# Patient Record
Sex: FEMALE | Race: WHITE | NOT HISPANIC OR LATINO | Employment: FULL TIME | URBAN - NONMETROPOLITAN AREA
[De-identification: names, ages, dates, MRNs, and addresses within clinical notes are randomized per-mention and may not be internally consistent; named-entity substitution may affect disease eponyms.]

---

## 2023-10-11 ENCOUNTER — OFFICE VISIT (OUTPATIENT)
Dept: INTERNAL MEDICINE | Facility: CLINIC | Age: 58
End: 2023-10-11
Payer: COMMERCIAL

## 2023-10-11 VITALS
RESPIRATION RATE: 20 BRPM | WEIGHT: 128 LBS | HEIGHT: 68 IN | OXYGEN SATURATION: 97 % | TEMPERATURE: 97.1 F | DIASTOLIC BLOOD PRESSURE: 82 MMHG | BODY MASS INDEX: 19.4 KG/M2 | SYSTOLIC BLOOD PRESSURE: 124 MMHG | HEART RATE: 88 BPM

## 2023-10-11 DIAGNOSIS — M19.90 ARTHRITIS: Primary | ICD-10-CM

## 2023-10-11 DIAGNOSIS — G43.809 OTHER MIGRAINE WITHOUT STATUS MIGRAINOSUS, NOT INTRACTABLE: ICD-10-CM

## 2023-10-11 DIAGNOSIS — R21 RASH: ICD-10-CM

## 2023-10-11 DIAGNOSIS — K20.0 EOSINOPHILIC ESOPHAGITIS: ICD-10-CM

## 2023-10-11 DIAGNOSIS — E03.9 ACQUIRED HYPOTHYROIDISM: ICD-10-CM

## 2023-10-11 PROBLEM — G43.909 MIGRAINE: Status: ACTIVE | Noted: 2023-10-11

## 2023-10-11 RX ORDER — RIZATRIPTAN BENZOATE 10 MG/1
10 TABLET ORAL ONCE AS NEEDED
COMMUNITY
Start: 2002-11-01 | End: 2023-10-11 | Stop reason: SDUPTHER

## 2023-10-11 RX ORDER — EPINEPHRINE 0.3 MG/.3ML
INJECTION SUBCUTANEOUS
COMMUNITY
Start: 2023-06-19

## 2023-10-11 RX ORDER — LEVOTHYROXINE SODIUM 0.1 MG/1
100 TABLET ORAL DAILY
Qty: 90 TABLET | Refills: 3 | Status: SHIPPED | OUTPATIENT
Start: 2023-10-11

## 2023-10-11 RX ORDER — ESTRADIOL 0.1 MG/G
CREAM VAGINAL
COMMUNITY
End: 2023-10-11 | Stop reason: SDUPTHER

## 2023-10-11 RX ORDER — ESTRADIOL 0.1 MG/G
1 CREAM VAGINAL 2 TIMES WEEKLY
Qty: 42.5 G | Refills: 5 | Status: SHIPPED | OUTPATIENT
Start: 2023-10-12

## 2023-10-11 RX ORDER — DIPHENOXYLATE HYDROCHLORIDE AND ATROPINE SULFATE 2.5; .025 MG/1; MG/1
1 TABLET ORAL DAILY
COMMUNITY
Start: 2021-01-01

## 2023-10-11 RX ORDER — CELECOXIB 200 MG/1
200 CAPSULE ORAL DAILY
Qty: 90 CAPSULE | Refills: 3 | Status: SHIPPED | OUTPATIENT
Start: 2023-10-11

## 2023-10-11 RX ORDER — LEVOTHYROXINE SODIUM 0.1 MG/1
100 TABLET ORAL
COMMUNITY
Start: 1996-01-01 | End: 2023-10-11 | Stop reason: SDUPTHER

## 2023-10-11 RX ORDER — RIZATRIPTAN BENZOATE 10 MG/1
10 TABLET ORAL ONCE AS NEEDED
Qty: 27 TABLET | Refills: 3 | Status: SHIPPED | OUTPATIENT
Start: 2023-10-11

## 2023-10-11 NOTE — PROGRESS NOTES
"Subjective     Patient ID: Agata Talbert is a 58 y.o. female. Patient is here for management of multiple medical problems.     Chief Complaint   Patient presents with    Foot Injury    Cough     History of Present Illness   Getting established[place]       The following portions of the patient's history were reviewed and updated as appropriate: allergies, current medications, past family history, past medical history, past social history, past surgical history and problem list.    Review of Systems    Current Outpatient Medications:     EPINEPHrine (EPIPEN) 0.3 MG/0.3ML solution auto-injector injection, INJECT AS NEEDED FOR SYSTEMIC ALLERGIC REACTION, Disp: , Rfl:     estradiol (ESTRACE) 0.1 MG/GM vaginal cream, Insert 1 g into the vagina 2 (Two) Times a Week., Disp: 42.5 g, Rfl: 5    levothyroxine (SYNTHROID, LEVOTHROID) 100 MCG tablet, Take 1 tablet by mouth Daily., Disp: 90 tablet, Rfl: 3    multivitamin (MULTI-VITAMIN DAILY PO), 1 tablet Daily., Disp: , Rfl:     rizatriptan (MAXALT) 10 MG tablet, Take 1 tablet by mouth 1 (One) Time As Needed for Migraine., Disp: 27 tablet, Rfl: 3    celecoxib (CeleBREX) 200 MG capsule, Take 1 capsule by mouth Daily., Disp: 90 capsule, Rfl: 3    Diclofenac Sodium (VOLTAREN) 1 % gel gel, Apply 4 g topically to the appropriate area as directed 4 (Four) Times a Day As Needed (pain)., Disp: 100 g, Rfl: 1    Objective      Blood pressure 124/82, pulse 88, temperature 97.1 øF (36.2 øC), temperature source Temporal, resp. rate 20, height 172.7 cm (68\"), weight 58.1 kg (128 lb), SpO2 97%.    Physical Exam     General Appearance:    Alert, cooperative, no distress, appears stated age   Head:    Normocephalic, without obvious abnormality, atraumatic   Eyes:    PERRL, conjunctiva/corneas clear, EOM's intact   Ears:    Normal TM's and external ear canals, both ears   Nose:   Nares normal, septum midline, mucosa normal, no drainage   or sinus tenderness   Throat:   Lips, " mucosa, and tongue normal; teeth and gums normal   Neck:   Supple, symmetrical, trachea midline, no adenopathy;        thyroid:  No enlargement/tenderness/nodules; no carotid    bruit or JVD   Back:     Symmetric, no curvature, ROM normal, no CVA tenderness   Lungs:     Clear to auscultation bilaterally, respirations unlabored   Chest wall:    No tenderness or deformity   Heart:    Regular rate and rhythm, S1 and S2 normal, no murmur,        rub or gallop   Abdomen:     Soft, non-tender, bowel sounds active all four quadrants,     no masses, no organomegaly   Extremities:   Extremities normal, atraumatic, no cyanosis or edema   Pulses:   2+ and symmetric all extremities   Skin:   Skin color, texture, turgor normal, no rashes or lesions   Lymph nodes:   Cervical, supraclavicular, and axillary nodes normal   Neurologic:   CNII-XII intact. Normal strength, sensation and reflexes       throughout      No results found for this or any previous visit.      Assessment & Plan     Failed most nsaids.  Wants celbrex.    Arthrits worked up for the most part. From [place]. Arthritis never fully under stood.    Feels tired.   Memory fog.    Covid vaccined were given long after the arthritis was an issue.      MRI spin and myelogram. For Siatica.  Had surgy exploratory had nerve decompression. Not seen on studies.       EOE multiple food allergies.  Started in desensitizing inj. Not helpful.            Diagnoses and all orders for this visit:    1. Arthritis (Primary)  -     [place] Spotted Fever, IgM  -     [place] Spotted Fever, IgG  -     Lyme Disease, PCR - , Arm, Right  -     Ehrlichia Antibody Panel  -     Vitamin B6  -     Antistreptolysin O Titer  -     Comprehensive Metabolic Panel  -     CBC & Differential  -     Strongyloides Antibody IgG, JESSEE  -     Alpha-Gal IgE Panel  -     H. Pylori Antibody, IgG (CLOVIS, COR)  -     H. Pylori Breath Test - Breath, Lung  -     Ambulatory Referral to Gastroenterology    2. Other  migraine without status migrainosus, not intractable  -     rizatriptan (MAXALT) 10 MG tablet; Take 1 tablet by mouth 1 (One) Time As Needed for Migraine.  Dispense: 27 tablet; Refill: 3  -     Discontinue: CeleBREX 200 MG capsule; Take 1 capsule by mouth Daily.  Dispense: 90 capsule; Refill: 3  -     [place] Spotted Fever, IgM  -     [place] Spotted Fever, IgG  -     Lyme Disease, PCR - , Arm, Right  -     Ehrlichia Antibody Panel  -     Vitamin B6  -     Antistreptolysin O Titer  -     Comprehensive Metabolic Panel  -     CBC & Differential  -     Strongyloides Antibody IgG, JESSEE  -     Alpha-Gal IgE Panel  -     H. Pylori Antibody, IgG ([facility], COR)  -     H. Pylori Breath Test - Breath, Lung  -     Ambulatory Referral to Gastroenterology    3. Rash  -     [place] Spotted Fever, IgM  -     [place] Spotted Fever, IgG  -     Lyme Disease, PCR - , Arm, Right  -     Ehrlichia Antibody Panel  -     Vitamin B6  -     Antistreptolysin O Titer  -     Comprehensive Metabolic Panel  -     CBC & Differential  -     Strongyloides Antibody IgG, JESSEE  -     Alpha-Gal IgE Panel  -     H. Pylori Antibody, IgG ([facility], COR)  -     H. Pylori Breath Test - Breath, Lung  -     Ambulatory Referral to Gastroenterology    4. Eosinophilic esophagitis  -     [place] Spotted Fever, IgM  -     [place] Spotted Fever, IgG  -     Lyme Disease, PCR - , Arm, Right  -     Ehrlichia Antibody Panel  -     Vitamin B6  -     Antistreptolysin O Titer  -     Comprehensive Metabolic Panel  -     CBC & Differential  -     Strongyloides Antibody IgG, JESSEE  -     Alpha-Gal IgE Panel  -     H. Pylori Antibody, IgG ([facility], COR)  -     H. Pylori Breath Test - Breath, Lung  -     Ambulatory Referral to Gastroenterology    5. Acquired hypothyroidism  -     levothyroxine (SYNTHROID, LEVOTHROID) 100 MCG tablet; Take 1 tablet by mouth Daily.  Dispense: 90 tablet; Refill: 3    Other orders  -     estradiol (ESTRACE) 0.1 MG/GM vaginal cream;  Insert 1 g into the vagina 2 (Two) Times a Week.  Dispense: 42.5 g; Refill: 5  -     Diclofenac Sodium (VOLTAREN) 1 % gel gel; Apply 4 g topically to the appropriate area as directed 4 (Four) Times a Day As Needed (pain).  Dispense: 100 g; Refill: 1  -     celecoxib (CeleBREX) 200 MG capsule; Take 1 capsule by mouth Daily.  Dispense: 90 capsule; Refill: 3      No follow-ups on file.          There are no Patient Instructions on file for this visit.     Diogo Monge MD    Assessment & Plan

## 2023-10-11 NOTE — LETTER
October 13, 2023     Patient: Agata Talbert   YOB: 1965   Date of Visit: 10/11/2023       To Whom It May Concern:    It is my medical opinion that Agata Talbert .           Sincerely,        Diogo Monge MD    CC: No Recipients

## 2023-10-12 ENCOUNTER — TELEPHONE (OUTPATIENT)
Dept: INTERNAL MEDICINE | Facility: CLINIC | Age: 58
End: 2023-10-12
Payer: COMMERCIAL

## 2023-10-12 NOTE — TELEPHONE ENCOUNTER
Kay called along with patient on the line asking for codes for all the labwork ordered on 10/11/2023, so that patient can ensure that these will be covered by her insurance and know her costs before completing the test. Call them at [phone]

## 2023-10-18 LAB
A PHAGOCYTOPH IGG TITR SER IF: NEGATIVE {TITER}
A PHAGOCYTOPH IGM TITR SER IF: NEGATIVE {TITER}
ALBUMIN SERPL-MCNC: 4.8 G/DL (ref 3.5–5.2)
ALBUMIN/GLOB SERPL: 3.4 G/DL
ALP SERPL-CCNC: 67 U/L (ref 39–117)
ALPHA-GAL IGE QN: <0.1 KU/L
ALT SERPL-CCNC: 15 U/L (ref 1–33)
ASO AB SERPL-ACNC: <20 IU/ML (ref 0–200)
AST SERPL-CCNC: 18 U/L (ref 1–32)
B BURGDOR DNA SPEC QL NAA+PROBE: NEGATIVE
BASOPHILS # BLD AUTO: 0.06 10*3/MM3 (ref 0–0.2)
BASOPHILS NFR BLD AUTO: 1.5 % (ref 0–1.5)
BEEF IGE QN: <0.1 KU/L
BILIRUB SERPL-MCNC: 0.7 MG/DL (ref 0–1.2)
BUN SERPL-MCNC: 11 MG/DL (ref 6–20)
BUN/CREAT SERPL: 13.6 (ref 7–25)
CALCIUM SERPL-MCNC: 10.4 MG/DL (ref 8.6–10.5)
CHLORIDE SERPL-SCNC: 101 MMOL/L (ref 98–107)
CO2 SERPL-SCNC: 25.4 MMOL/L (ref 22–29)
CONV CLASS DESCRIPTION: NORMAL
CREAT SERPL-MCNC: 0.81 MG/DL (ref 0.57–1)
E CHAFFEENSIS IGG TITR SER IF: NEGATIVE {TITER}
E CHAFFEENSIS IGM TITR SER IF: NEGATIVE {TITER}
EGFRCR SERPLBLD CKD-EPI 2021: 84.3 ML/MIN/1.73
EOSINOPHIL # BLD AUTO: 0.05 10*3/MM3 (ref 0–0.4)
EOSINOPHIL NFR BLD AUTO: 1.3 % (ref 0.3–6.2)
ERYTHROCYTE [DISTWIDTH] IN BLOOD BY AUTOMATED COUNT: 12.3 % (ref 12.3–15.4)
GLOBULIN SER CALC-MCNC: 1.4 GM/DL
GLUCOSE SERPL-MCNC: 92 MG/DL (ref 65–99)
HCT VFR BLD AUTO: 42.1 % (ref 34–46.6)
HGB BLD-MCNC: 14.2 G/DL (ref 12–15.9)
IGE SERPL-ACNC: 107 IU/ML (ref 6–495)
IMM GRANULOCYTES # BLD AUTO: 0.01 10*3/MM3 (ref 0–0.05)
IMM GRANULOCYTES NFR BLD AUTO: 0.3 % (ref 0–0.5)
LAMB IGE QN: <0.1 KU/L
LYMPHOCYTES # BLD AUTO: 1.45 10*3/MM3 (ref 0.7–3.1)
LYMPHOCYTES NFR BLD AUTO: 37.2 % (ref 19.6–45.3)
MCH RBC QN AUTO: 29 PG (ref 26.6–33)
MCHC RBC AUTO-ENTMCNC: 33.7 G/DL (ref 31.5–35.7)
MCV RBC AUTO: 86.1 FL (ref 79–97)
MONOCYTES # BLD AUTO: 0.27 10*3/MM3 (ref 0.1–0.9)
MONOCYTES NFR BLD AUTO: 6.9 % (ref 5–12)
NEUTROPHILS # BLD AUTO: 2.06 10*3/MM3 (ref 1.7–7)
NEUTROPHILS NFR BLD AUTO: 52.8 % (ref 42.7–76)
NRBC BLD AUTO-RTO: 0 /100 WBC (ref 0–0.2)
PLATELET # BLD AUTO: 229 10*3/MM3 (ref 140–450)
PORK IGE QN: <0.1 KU/L
POTASSIUM SERPL-SCNC: 3.9 MMOL/L (ref 3.5–5.2)
PROT SERPL-MCNC: 6.2 G/DL (ref 6–8.5)
PYRIDOXAL PHOS SERPL-MCNC: 50 UG/L (ref 3.4–65.2)
R RICKETTSI IGG SER QL IA: NEGATIVE
R RICKETTSI IGM SER-ACNC: 0.27 INDEX (ref 0–0.89)
RBC # BLD AUTO: 4.89 10*6/MM3 (ref 3.77–5.28)
RESULT COMMENT:: NORMAL
SODIUM SERPL-SCNC: 138 MMOL/L (ref 136–145)
STRONGYLOIDES IGG SER QL IA: NEGATIVE
UREA BREATH TEST QL: NEGATIVE
WBC # BLD AUTO: 3.9 10*3/MM3 (ref 3.4–10.8)

## 2024-01-17 ENCOUNTER — HOSPITAL ENCOUNTER (OUTPATIENT)
Dept: CT IMAGING | Facility: HOSPITAL | Age: 59
Discharge: HOME | End: 2024-01-17
Admitting: INTERNAL MEDICINE
Payer: COMMERCIAL

## 2024-01-17 ENCOUNTER — OFFICE VISIT (OUTPATIENT)
Dept: INTERNAL MEDICINE | Facility: CLINIC | Age: 59
End: 2024-01-17
Payer: COMMERCIAL

## 2024-01-17 VITALS
TEMPERATURE: 98.2 F | OXYGEN SATURATION: 97 % | BODY MASS INDEX: 24.88 KG/M2 | DIASTOLIC BLOOD PRESSURE: 88 MMHG | RESPIRATION RATE: 16 BRPM | WEIGHT: 168 LBS | HEART RATE: 83 BPM | SYSTOLIC BLOOD PRESSURE: 128 MMHG | HEIGHT: 69 IN

## 2024-01-17 DIAGNOSIS — G47.33 OSA (OBSTRUCTIVE SLEEP APNEA): ICD-10-CM

## 2024-01-17 DIAGNOSIS — Z12.31 ENCOUNTER FOR SCREENING MAMMOGRAM FOR MALIGNANT NEOPLASM OF BREAST: Primary | ICD-10-CM

## 2024-01-17 DIAGNOSIS — M54.50 CHRONIC RIGHT-SIDED LOW BACK PAIN WITHOUT SCIATICA: ICD-10-CM

## 2024-01-17 DIAGNOSIS — R10.31 RIGHT LOWER QUADRANT ABDOMINAL PAIN: ICD-10-CM

## 2024-01-17 DIAGNOSIS — G89.29 CHRONIC RIGHT-SIDED LOW BACK PAIN WITHOUT SCIATICA: ICD-10-CM

## 2024-01-17 PROCEDURE — 74178 CT ABD&PLV WO CNTR FLWD CNTR: CPT

## 2024-01-17 PROCEDURE — 99214 OFFICE O/P EST MOD 30 MIN: CPT | Performed by: INTERNAL MEDICINE

## 2024-01-17 PROCEDURE — 25510000001 IOPAMIDOL 61 % SOLUTION: Performed by: INTERNAL MEDICINE

## 2024-01-17 RX ADMIN — IOPAMIDOL 100 ML: 612 INJECTION, SOLUTION INTRAVENOUS at 17:10

## 2024-01-17 NOTE — PROGRESS NOTES
"Subjective     Patient ID: Agata Talbert is a 59 y.o. female. Patient is here for management of multiple medical problems.     Chief Complaint   Patient presents with    Arthritis    Pelvic Pain     History of Present Illness       Arthritis.         November 2022. Pain in right lower quad. Seen gyn. Overies ok.     2023 got a bit worse. Seen GI.   I started seening her hin Oct.  No sig pain. Then.     Now after eating.   Lower back is back and now daily since November. After eating back pain and ab pain.        The following portions of the patient's history were reviewed and updated as appropriate: allergies, current medications, past family history, past medical history, past social history, past surgical history and problem list.    Review of Systems    Current Outpatient Medications:     EPINEPHrine (EPIPEN) 0.3 MG/0.3ML solution auto-injector injection, INJECT AS NEEDED FOR SYSTEMIC ALLERGIC REACTION, Disp: , Rfl:     levothyroxine (SYNTHROID, LEVOTHROID) 100 MCG tablet, Take 1 tablet by mouth Daily. (Patient taking differently: Take 1 tablet by mouth Daily. Take 100 MCG daily, except for Sundays.), Disp: 90 tablet, Rfl: 3    multivitamin (MULTI-VITAMIN DAILY PO), 1 tablet Daily., Disp: , Rfl:     rizatriptan (MAXALT) 10 MG tablet, Take 1 tablet by mouth 1 (One) Time As Needed for Migraine., Disp: 27 tablet, Rfl: 3    Objective      Blood pressure 128/88, pulse 83, temperature 98.2 °F (36.8 °C), resp. rate 16, height 175.3 cm (69\"), weight 76.2 kg (168 lb), SpO2 97%.    BMI is within normal parameters. No other follow-up for BMI required.       Physical Exam     General Appearance:    Alert, cooperative, no distress, appears stated age   Head:    Normocephalic, without obvious abnormality, atraumatic   Eyes:    PERRL, conjunctiva/corneas clear, EOM's intact   Ears:    Normal TM's and external ear canals, both ears   Nose:   Nares normal, septum midline, mucosa normal, no drainage   or sinus tenderness "   Throat:   Narrowed oral air way. Lips, mucosa, and tongue normal; teeth and gums normal   Neck:   Supple, symmetrical, trachea midline, no adenopathy;        thyroid:  No enlargement/tenderness/nodules; no carotid    bruit or JVD   Back:     Symmetric, no curvature, ROM normal, no CVA tenderness   Lungs:     Clear to auscultation bilaterally, respirations unlabored   Chest wall:    No tenderness or deformity   Heart:    Regular rate and rhythm, S1 and S2 normal, no murmur,        rub or gallop   Abdomen:     Soft, non-tender, bowel sounds active all four quadrants,     no masses, no organomegaly   Extremities:   Extremities normal, atraumatic, no cyanosis or edema   Pulses:   2+ and symmetric all extremities   Skin:   Skin color, texture, turgor normal, no rashes or lesions   Lymph nodes:   Cervical, supraclavicular, and axillary nodes normal   Neurologic:   CNII-XII intact. Normal strength, sensation and reflexes       throughout      Results for orders placed or performed in visit on 10/11/23   Lyme Disease, PCR - , Arm, Right    Specimen: Arm, Right   Result Value Ref Range    Lyme Disease(B.burgdorferi)PCR Negative Negative   H. Pylori Breath Test - Breath, Lung    Specimen: Lung; Breath   Result Value Ref Range    H. pylori Breath Test Negative Negative   [place] Spotted Fever, IgM    Specimen: Blood   Result Value Ref Range    RMSF IgM 0.27 0.00 - 0.89 index   [facility] Spotted Fever, IgG    Specimen: Blood   Result Value Ref Range    RMSF IgG Negative Negative   Ehrlichia Antibody Panel    Specimen: Blood   Result Value Ref Range    E. chaffeensis ([facility]) IgG Titer Negative Neg:<1:64    E. chaffeensis ([facility]) IgM Titer Negative Neg:<1:20    [facility] IgG Titer Negative Neg:<1:64    [facility] IgM Titer Negative Neg:<1:20    RESULT COMMENT: Comment    Vitamin B6    Specimen: Blood   Result Value Ref Range    Vitamin B6 50.0 3.4 - 65.2 ug/L   Antistreptolysin O Titer    Specimen: Blood   Result Value Ref Range    ASO  <20.0 0.0 - 200.0 IU/mL   Comprehensive Metabolic Panel    Specimen: Blood   Result Value Ref Range    Glucose 92 65 - 99 mg/dL    BUN 11 6 - 20 mg/dL    Creatinine 0.81 0.57 - 1.00 mg/dL    EGFR Result 84.3 >60.0 mL/min/1.73    BUN/Creatinine Ratio 13.6 7.0 - 25.0    Sodium 138 136 - 145 mmol/L    Potassium 3.9 3.5 - 5.2 mmol/L    Chloride 101 98 - 107 mmol/L    Total CO2 25.4 22.0 - 29.0 mmol/L    Calcium 10.4 8.6 - 10.5 mg/dL    Total Protein 6.2 6.0 - 8.5 g/dL    Albumin 4.8 3.5 - 5.2 g/dL    Globulin 1.4 gm/dL    A/G Ratio 3.4 g/dL    Total Bilirubin 0.7 0.0 - 1.2 mg/dL    Alkaline Phosphatase 67 39 - 117 U/L    AST (SGOT) 18 1 - 32 U/L    ALT (SGPT) 15 1 - 33 U/L   Strongyloides Antibody IgG, JESSEE    Specimen: Blood   Result Value Ref Range    Strongyloides Ab IgG Negative Negative   Alpha-Gal IgE Panel    Specimen: Blood   Result Value Ref Range    Class Description Comment     IgE 107 6 - 495 IU/mL    [id number] Alpha-Gal <0.10 Class 0 kU/L    Beef <0.10 Class 0 kU/L    Pork <0.10 Class 0 kU/L    Lamb <0.10 Class 0 kU/L   CBC & Differential    Specimen: Blood   Result Value Ref Range    WBC 3.90 3.40 - 10.80 10*3/mm3    RBC 4.89 3.77 - 5.28 10*6/mm3    Hemoglobin 14.2 12.0 - 15.9 g/dL    Hematocrit 42.1 34.0 - 46.6 %    MCV 86.1 79.0 - 97.0 fL    MCH 29.0 26.6 - 33.0 pg    MCHC 33.7 31.5 - 35.7 g/dL    RDW 12.3 12.3 - 15.4 %    Platelets 229 140 - 450 10*3/mm3    Neutrophil Rel % 52.8 42.7 - 76.0 %    Lymphocyte Rel % 37.2 19.6 - 45.3 %    Monocyte Rel % 6.9 5.0 - 12.0 %    Eosinophil Rel % 1.3 0.3 - 6.2 %    Basophil Rel % 1.5 0.0 - 1.5 %    Neutrophils Absolute 2.06 1.70 - 7.00 10*3/mm3    Lymphocytes Absolute 1.45 0.70 - 3.10 10*3/mm3    Monocytes Absolute 0.27 0.10 - 0.90 10*3/mm3    Eosinophils Absolute 0.05 0.00 - 0.40 10*3/mm3    Basophils Absolute 0.06 0.00 - 0.20 10*3/mm3    Immature Granulocyte Rel % 0.3 0.0 - 0.5 %    Immature Grans Absolute 0.01 0.00 - 0.05 10*3/mm3    nRBC 0.0 0.0 - 0.2 /100 WBC          Assessment & Plan       Pt with difficulty getting to sleep. Light sleeper.   Easily wakes to herself snoring.     Pain in right lower ab. Waxes and wanes.  X 1 year. Overall worsening in the last 6 months. Will get CT eval for mass or other issuse in the right lower ab.           Diagnoses and all orders for this visit:    1. Encounter for screening mammogram for malignant neoplasm of breast (Primary)    2. Right lower quadrant abdominal pain  -     CT Abdomen Pelvis With & Without Contrast; Future  -     Basic metabolic panel    3. Chronic right-sided low back pain without sciatica    4. MEGAN (obstructive sleep apnea)  -     Home Sleep Study  -     Ambulatory Referral to Sleep Medicine      Return in about 4 months (around 5/17/2024).          There are no Patient Instructions on file for this visit.     Diogo Monge MD    Assessment & Plan       Answers submitted by the patient for this visit:  Primary Reason for Visit (Submitted on 1/10/2024)  What is the primary reason for your visit?: Back Pain

## 2024-01-18 NOTE — PROGRESS NOTES
Details      Reading Physician Reading Date Result Priority  Jonathan Ibanez MD  [phone] 1/17/2024     Narrative & Impression  FINAL REPORT    TECHNIQUE:  Routine axial images were obtained from the lung bases to below  the iliac crest, before and following IV contrast administration.    CLINICAL HISTORY:  See Diagnosis  rt lower quad abd pain    FINDINGS:  Abdomen: The gallbladder, solid abdominal organs and ureters are  normal. The GI tract is normal, with no sign of appendicitis.  Pelvis: The uterus and ovaries are not visualized.  The urinary  bladder is normal. There is no pelvic or abdominal ascites,  adenopathy or acute osseous abnormality.  There are  postoperative changes in the lower lumbar spine.    IMPRESSION:  No acute disease.  Per radiology.    I see a big bladder and arthritis in right hip.

## 2024-01-23 ENCOUNTER — OFFICE VISIT (OUTPATIENT)
Dept: INTERNAL MEDICINE | Facility: CLINIC | Age: 59
End: 2024-01-23
Payer: COMMERCIAL

## 2024-01-23 VITALS
DIASTOLIC BLOOD PRESSURE: 78 MMHG | HEIGHT: 69 IN | OXYGEN SATURATION: 98 % | WEIGHT: 168 LBS | RESPIRATION RATE: 16 BRPM | SYSTOLIC BLOOD PRESSURE: 104 MMHG | HEART RATE: 74 BPM | BODY MASS INDEX: 24.88 KG/M2

## 2024-01-23 DIAGNOSIS — N32.0 BLADDER OBSTRUCTION: ICD-10-CM

## 2024-01-23 DIAGNOSIS — Z12.31 ENCOUNTER FOR SCREENING MAMMOGRAM FOR MALIGNANT NEOPLASM OF BREAST: Primary | ICD-10-CM

## 2024-01-23 DIAGNOSIS — M54.50 CHRONIC RIGHT-SIDED LOW BACK PAIN WITHOUT SCIATICA: ICD-10-CM

## 2024-01-23 DIAGNOSIS — G89.29 CHRONIC RIGHT-SIDED LOW BACK PAIN WITHOUT SCIATICA: ICD-10-CM

## 2024-01-23 RX ORDER — POLYETHYLENE GLYCOL 3350 17 G/17G
17 POWDER, FOR SOLUTION ORAL DAILY
Qty: 1 EACH | Refills: 3 | Status: SHIPPED | OUTPATIENT
Start: 2024-01-23

## 2024-01-23 NOTE — PROGRESS NOTES
"Subjective     Patient ID: Agata Talbert is a 59 y.o. female. Patient is here for management of multiple medical problems.     Chief Complaint   Patient presents with    Abdominal Pain    Arthritis     Right hip     History of Present Illness     The following portions of the patient's history were reviewed and updated as appropriate: allergies, current medications, past family history, past medical history, past social history, past surgical history and problem list.    Review of Systems    Current Outpatient Medications:     EPINEPHrine (EPIPEN) 0.3 MG/0.3ML solution auto-injector injection, INJECT AS NEEDED FOR SYSTEMIC ALLERGIC REACTION, Disp: , Rfl:     levothyroxine (SYNTHROID, LEVOTHROID) 100 MCG tablet, Take 1 tablet by mouth Daily. (Patient taking differently: Take 1 tablet by mouth Daily. Take 100 MCG daily, except for Sundays.), Disp: 90 tablet, Rfl: 3    multivitamin (MULTI-VITAMIN DAILY PO), 1 tablet Daily., Disp: , Rfl:     rizatriptan (MAXALT) 10 MG tablet, Take 1 tablet by mouth 1 (One) Time As Needed for Migraine., Disp: 27 tablet, Rfl: 3    polyethylene glycol (MIRALAX) 17 g packet, Take 17 g by mouth Daily., Disp: 1 each, Rfl: 3    Objective      Blood pressure 104/78, pulse 74, resp. rate 16, height 175.3 cm (69\"), weight 76.2 kg (168 lb), SpO2 98%.    BMI is within normal parameters. No other follow-up for BMI required.       Physical Exam     General Appearance:    Alert, cooperative, no distress, appears stated age   Head:    Normocephalic, without obvious abnormality, atraumatic   Eyes:    PERRL, conjunctiva/corneas clear, EOM's intact   Ears:    Normal TM's and external ear canals, both ears   Nose:   Nares normal, septum midline, mucosa normal, no drainage   or sinus tenderness   Throat:   Lips, mucosa, and tongue normal; teeth and gums normal   Neck:   Supple, symmetrical, trachea midline, no adenopathy;        thyroid:  No enlargement/tenderness/nodules; no carotid    bruit or JVD "   Back:     Symmetric, no curvature, ROM normal, no CVA tenderness   Lungs:     Clear to auscultation bilaterally, respirations unlabored   Chest wall:    No tenderness or deformity   Heart:    Regular rate and rhythm, S1 and S2 normal, no murmur,        rub or gallop   Abdomen:     Soft, non-tender, bowel sounds active all four quadrants,     no masses, no organomegaly   Extremities:   Extremities normal, atraumatic, no cyanosis or edema   Pulses:   2+ and symmetric all extremities   Skin:   Skin color, texture, turgor normal, no rashes or lesions   Lymph nodes:   Cervical, supraclavicular, and axillary nodes normal   Neurologic:   CNII-XII intact. Normal strength, sensation and reflexes       throughout      Results for orders placed or performed in visit on 10/11/23   Lyme Disease, PCR - , Arm, Right    Specimen: Arm, Right   Result Value Ref Range    Lyme Disease(B.burgdorferi)PCR Negative Negative   H. Pylori Breath Test - Breath, Lung    Specimen: Lung; Breath   Result Value Ref Range    H. pylori Breath Test Negative Negative   [place] Spotted Fever, IgM    Specimen: Blood   Result Value Ref Range    RMSF IgM 0.27 0.00 - 0.89 index   [facility] Spotted Fever, IgG    Specimen: Blood   Result Value Ref Range    RMSF IgG Negative Negative   Ehrlichia Antibody Panel    Specimen: Blood   Result Value Ref Range    E. chaffeensis ([facility]) IgG Titer Negative Neg:<1:64    E. chaffeensis ([facility]) IgM Titer Negative Neg:<1:20    [facility] IgG Titer Negative Neg:<1:64    [facility] IgM Titer Negative Neg:<1:20    RESULT COMMENT: Comment    Vitamin B6    Specimen: Blood   Result Value Ref Range    Vitamin B6 50.0 3.4 - 65.2 ug/L   Antistreptolysin O Titer    Specimen: Blood   Result Value Ref Range    ASO <20.0 0.0 - 200.0 IU/mL   Comprehensive Metabolic Panel    Specimen: Blood   Result Value Ref Range    Glucose 92 65 - 99 mg/dL    BUN 11 6 - 20 mg/dL    Creatinine 0.81 0.57 - 1.00 mg/dL    EGFR Result 84.3 >60.0 mL/min/1.73     BUN/Creatinine Ratio 13.6 7.0 - 25.0    Sodium 138 136 - 145 mmol/L    Potassium 3.9 3.5 - 5.2 mmol/L    Chloride 101 98 - 107 mmol/L    Total CO2 25.4 22.0 - 29.0 mmol/L    Calcium 10.4 8.6 - 10.5 mg/dL    Total Protein 6.2 6.0 - 8.5 g/dL    Albumin 4.8 3.5 - 5.2 g/dL    Globulin 1.4 gm/dL    A/G Ratio 3.4 g/dL    Total Bilirubin 0.7 0.0 - 1.2 mg/dL    Alkaline Phosphatase 67 39 - 117 U/L    AST (SGOT) 18 1 - 32 U/L    ALT (SGPT) 15 1 - 33 U/L   Strongyloides Antibody IgG, JESSEE    Specimen: Blood   Result Value Ref Range    Strongyloides Ab IgG Negative Negative   Alpha-Gal IgE Panel    Specimen: Blood   Result Value Ref Range    Class Description Comment     IgE 107 6 - 495 IU/mL    [id number] Alpha-Gal <0.10 Class 0 kU/L    Beef <0.10 Class 0 kU/L    Pork <0.10 Class 0 kU/L    Lamb <0.10 Class 0 kU/L   CBC & Differential    Specimen: Blood   Result Value Ref Range    WBC 3.90 3.40 - 10.80 10*3/mm3    RBC 4.89 3.77 - 5.28 10*6/mm3    Hemoglobin 14.2 12.0 - 15.9 g/dL    Hematocrit 42.1 34.0 - 46.6 %    MCV 86.1 79.0 - 97.0 fL    MCH 29.0 26.6 - 33.0 pg    MCHC 33.7 31.5 - 35.7 g/dL    RDW 12.3 12.3 - 15.4 %    Platelets 229 140 - 450 10*3/mm3    Neutrophil Rel % 52.8 42.7 - 76.0 %    Lymphocyte Rel % 37.2 19.6 - 45.3 %    Monocyte Rel % 6.9 5.0 - 12.0 %    Eosinophil Rel % 1.3 0.3 - 6.2 %    Basophil Rel % 1.5 0.0 - 1.5 %    Neutrophils Absolute 2.06 1.70 - 7.00 10*3/mm3    Lymphocytes Absolute 1.45 0.70 - 3.10 10*3/mm3    Monocytes Absolute 0.27 0.10 - 0.90 10*3/mm3    Eosinophils Absolute 0.05 0.00 - 0.40 10*3/mm3    Basophils Absolute 0.06 0.00 - 0.20 10*3/mm3    Immature Granulocyte Rel % 0.3 0.0 - 0.5 %    Immature Grans Absolute 0.01 0.00 - 0.05 10*3/mm3    nRBC 0.0 0.0 - 0.2 /100 WBC         Assessment & Plan   Pain in right lower ab.  Last colonoscopy 2019.   Large amount of stool right side colon.   Arthritis in right hip.  Mulitple etiology.    No GB. Pt is s/p ccy.     Home sleep not done yet.      Ct ab.  Lumbar, s/p surgery, imaging scattered in this area. May just need xray. Will defer any further imaging to NS.     SLR neg. Hip exam neg.         Diagnoses and all orders for this visit:    1. Encounter for screening mammogram for malignant neoplasm of breast (Primary)  -     Mammo Screening Digital Tomosynthesis Bilateral With CAD; Future    2. Bladder obstruction  -     Ambulatory Referral to Urology    3. Chronic right-sided low back pain without sciatica  -     Ambulatory Referral to Neurosurgery    Other orders  -     polyethylene glycol (MIRALAX) 17 g packet; Take 17 g by mouth Daily.  Dispense: 1 each; Refill: 3      No follow-ups on file.          There are no Patient Instructions on file for this visit.     Diogo Monge MD    Assessment & Plan       Answers submitted by the patient for this visit:  Other (Submitted on 1/19/2024)  Please describe your symptoms.: LRQ and back pain  Have you had these symptoms before?: Yes  How long have you been having these symptoms?: Greater than 2 weeks  Please describe any probable cause for these symptoms. : can't do normal activities  Primary Reason for Visit (Submitted on 1/19/2024)  What is the primary reason for your visit?: Other

## 2024-02-05 ENCOUNTER — PATIENT ROUNDING (BHMG ONLY) (OUTPATIENT)
Dept: UROLOGY | Facility: CLINIC | Age: 59
End: 2024-02-05
Payer: COMMERCIAL

## 2024-02-05 ENCOUNTER — OFFICE VISIT (OUTPATIENT)
Dept: UROLOGY | Facility: CLINIC | Age: 59
End: 2024-02-05
Payer: COMMERCIAL

## 2024-02-05 VITALS
HEIGHT: 69 IN | DIASTOLIC BLOOD PRESSURE: 82 MMHG | SYSTOLIC BLOOD PRESSURE: 118 MMHG | BODY MASS INDEX: 24.88 KG/M2 | WEIGHT: 168 LBS

## 2024-02-05 DIAGNOSIS — N32.0 BLADDER OBSTRUCTION: Primary | ICD-10-CM

## 2024-02-05 PROBLEM — L30.9 ECZEMA: Status: ACTIVE | Noted: 2024-02-05

## 2024-02-05 PROBLEM — M51.369 DEGENERATION OF LUMBAR INTERVERTEBRAL DISC: Status: ACTIVE | Noted: 2024-02-05

## 2024-02-05 PROBLEM — M54.9 BACKACHE: Status: ACTIVE | Noted: 2024-02-05

## 2024-02-05 PROBLEM — M51.36 DEGENERATION OF LUMBAR INTERVERTEBRAL DISC: Status: ACTIVE | Noted: 2024-02-05

## 2024-02-05 LAB
BILIRUB BLD-MCNC: NEGATIVE MG/DL
CLARITY, POC: CLEAR
COLOR UR: YELLOW
EXPIRATION DATE: NORMAL
GLUCOSE UR STRIP-MCNC: NEGATIVE MG/DL
KETONES UR QL: NEGATIVE
LEUKOCYTE EST, POC: NEGATIVE
Lab: NORMAL
NITRITE UR-MCNC: NEGATIVE MG/ML
PH UR: 7 [PH] (ref 5–8)
PROT UR STRIP-MCNC: NEGATIVE MG/DL
RBC # UR STRIP: NEGATIVE /UL
SP GR UR: 1 (ref 1–1.03)
UROBILINOGEN UR QL: NORMAL

## 2024-02-05 PROCEDURE — 99203 OFFICE O/P NEW LOW 30 MIN: CPT | Performed by: NURSE PRACTITIONER

## 2024-02-05 PROCEDURE — 81003 URINALYSIS AUTO W/O SCOPE: CPT | Performed by: NURSE PRACTITIONER

## 2024-02-05 PROCEDURE — 51798 US URINE CAPACITY MEASURE: CPT | Performed by: NURSE PRACTITIONER

## 2024-02-05 NOTE — PROGRESS NOTES
Office Visit General Female New      Patient Name: Agata Talbert  : 1965   MRN: [id number]     Chief Complaint:   Chief Complaint   Patient presents with    Bladder obstruction     New patient       Referring Provider: Diogo Monge MD    History of Present Illness: Ms. Talbert is a 59 y.o. female with below past medical history who presents with worry over being told that her bladder was enlarged on recent CT imaging.  She has a history of L5-S1 fusion in  and history of hysterectomy.  She was recently seen by her PCP and was referred to urology for an enlarged bladder on CT imaging, GI for constipation, and orthopedics for dull lumbar back pain.      Denies UTI symptoms, no hematuria, no incontinence of bowel or bladder, no POP.  She urinates every 2 hours but attests that she drinks approximately 166 ounces of water per day and drinks 2 cups of coffee.  No juice.  Nocturia 1-2 times every 2 months.     She was restarted on Miralax and does not feel constipated today and has not noticed any change since starting this medication.  She is scheduled to establish care with orthopedics in 1 week and with GI in March.      Subjective      Review of System:   Review of Systems   Constitutional:  Negative for chills, diaphoresis and fever.   Gastrointestinal:  Negative for abdominal pain, constipation, diarrhea, nausea and vomiting.   Genitourinary:  Negative for decreased urine volume, difficulty urinating, dysuria, flank pain, frequency, hematuria, pelvic pain, pelvic pressure and urinary incontinence.   Musculoskeletal:  Positive for back pain.      Past Medical History:   Past Medical History:   Diagnosis Date    Arthritis     Eosinophilic esophagitis     Hashimoto's disease     Migraines     Vaginal atrophy        Past Surgical History:   Past Surgical History:   Procedure Laterality Date    BACK SURGERY      fusion l5 s1    CHOLECYSTECTOMY      HYSTERECTOMY         Family History:   Family  "History   Problem Relation Age of Onset    Breast cancer Mother     Atrial fibrillation Mother     Hypoparathyroidism Mother     Hypertension Mother     Cancer Mother     Heart disease Mother     Thyroid disease Father     Liver cancer Sister     Stroke Sister     Heart disease Sister     Sjogren's syndrome Sister     Clotting disorder Brother      Social History:   Social History     Socioeconomic History    Marital status:    Tobacco Use    Smoking status: Never    Smokeless tobacco: Never   Vaping Use    Vaping Use: Never used   Substance and Sexual Activity    Alcohol use: Never    Sexual activity: Not Currently     Birth control/protection: Hysterectomy     Medications:     Current Outpatient Medications:     EPINEPHrine (EPIPEN) 0.3 MG/0.3ML solution auto-injector injection, INJECT AS NEEDED FOR SYSTEMIC ALLERGIC REACTION, Disp: , Rfl:     levothyroxine (SYNTHROID, LEVOTHROID) 100 MCG tablet, Take 1 tablet by mouth Daily. (Patient taking differently: Take 1 tablet by mouth Daily. Take 100 MCG daily, except for Sundays.), Disp: 90 tablet, Rfl: 3    multivitamin (MULTI-VITAMIN DAILY PO), 1 tablet Daily., Disp: , Rfl:     polyethylene glycol (MIRALAX) 17 g packet, Take 17 g by mouth Daily., Disp: 1 each, Rfl: 3    rizatriptan (MAXALT) 10 MG tablet, Take 1 tablet by mouth 1 (One) Time As Needed for Migraine., Disp: 27 tablet, Rfl: 3    Allergies:   Allergies   Allergen Reactions    Bee Venom Anaphylaxis    Nuts Anaphylaxis    Potato Anaphylaxis    Soybean-Containing Drug Products Anaphylaxis       Objective     Physical Exam:   Vital Signs:   Vitals:    02/05/24 0948   BP: 118/82   BP Location: Left arm   Patient Position: Sitting   Cuff Size: Adult   Weight: 76.2 kg (168 lb)   Height: 175.3 cm (69\")     Body mass index is 24.81 kg/m².   Physical Exam  Vitals reviewed.   Constitutional:       General: She is not in acute distress.     Appearance: She is well-groomed. She is not ill-appearing.   Abdominal: "      General: Abdomen is flat. There is no distension.      Palpations: Abdomen is soft.      Tenderness: There is no abdominal tenderness.   Neurological:      Mental Status: She is alert and oriented to person, place, and time.   Psychiatric:         Attention and Perception: Attention normal.        Labs  Brief Urine Lab Results  (Last result in the past 365 days)        Color   Clarity   Blood   Leuk Est   Nitrite   Protein   CREAT   Urine HCG        02/05/24 0956 Yellow   Clear   Negative   Negative   Negative   Negative                 Lab Results   Component Value Date    GLUCOSE 92 10/13/2023    CALCIUM 10.4 10/13/2023     10/13/2023    K 3.9 10/13/2023    CO2 25.4 10/13/2023     10/13/2023    BUN 11 10/13/2023    CREATININE 0.81 10/13/2023    BCR 13.6 10/13/2023       Lab Results   Component Value Date    WBC 3.90 10/13/2023    HGB 14.2 10/13/2023    HCT 42.1 10/13/2023    MCV 86.1 10/13/2023     10/13/2023     Images:   CT Abdomen Pelvis With & Without Contrast    Result Date: 1/17/2024  No acute disease. Authenticated and Electronically Signed by Michelet Ibanez M.D. on 01/17/2024 08:41:21 PM    PVR  Post-void residual performed with ultrasound scanner by staff and interpreted by me - 0    Assessment / Plan      .Assessment  Ms. Talbert is a 59 y.o. female with enlarged bladder.  PVR was 0 and UA was negative for infection.  Reviewed CT imaging and there is no evidence of bladder obstruction and she denies obstructive symptoms.  Keep appointments with GI and Orthopedics as scheduled.  Follow up with our clinic if symptoms develop.      Plan  1. Reviewed UA and PVR.   2. Follow up PRN if you develop symptoms.      Follow Up:   Return if symptoms worsen or fail to improve.    ROBERT Sellers  [facility] [place] [place] [facility]

## 2024-02-09 NOTE — PROGRESS NOTES
February 9, 2024    Hello, may I speak with Agata Talbert? Unable to reach patient.    My name is Bharati.    I am  with [facility]  [address]  [address]  [facility] [address] [phone].    Before we get started may I verify your date of birth? 1965    I am calling to officially welcome you to our practice and ask about your recent visit. Is this a good time to talk?     Tell me about your visit with us. What things went well?         We're always looking for ways to make our patients' experiences even better. Do you have recommendations on ways we may improve?      Overall were you satisfied with your first visit to our practice?        I appreciate you taking the time to speak with me today. Is there anything else I can do for you?       Thank you, and have a great day.

## 2024-03-13 ENCOUNTER — OFFICE VISIT (OUTPATIENT)
Dept: GASTROENTEROLOGY | Facility: CLINIC | Age: 59
End: 2024-03-13
Payer: COMMERCIAL

## 2024-03-13 VITALS
DIASTOLIC BLOOD PRESSURE: 80 MMHG | HEART RATE: 100 BPM | SYSTOLIC BLOOD PRESSURE: 120 MMHG | WEIGHT: 167.2 LBS | OXYGEN SATURATION: 98 % | BODY MASS INDEX: 24.69 KG/M2

## 2024-03-13 DIAGNOSIS — R13.19 ESOPHAGEAL DYSPHAGIA: Primary | ICD-10-CM

## 2024-03-13 DIAGNOSIS — R19.4 CHANGE IN BOWEL HABITS: ICD-10-CM

## 2024-03-13 DIAGNOSIS — R10.31 ABDOMINAL PAIN, RLQ: ICD-10-CM

## 2024-03-13 DIAGNOSIS — K20.0 ESOPHAGITIS, EOSINOPHILIC: ICD-10-CM

## 2024-03-13 PROCEDURE — 99204 OFFICE O/P NEW MOD 45 MIN: CPT | Performed by: INTERNAL MEDICINE

## 2024-03-13 RX ORDER — SODIUM, POTASSIUM,MAG SULFATES 17.5-3.13G
1 SOLUTION, RECONSTITUTED, ORAL ORAL EVERY 12 HOURS
Qty: 354 ML | Refills: 0 | Status: SHIPPED | OUTPATIENT
Start: 2024-03-13 | End: 2024-03-14

## 2024-03-13 NOTE — PROGRESS NOTES
New Patient Consult      Date: 2024   Patient Name: Agata Talbert  MRN: [id number]  : 1965     Referring Physician: Diogo Monge MD    Chief Complaint   Patient presents with    Eosinophilic Esophagitis       History of Present Illness: Agata Talbert is a 59 y.o. female who is here today to establish care with Gastroenterology.    She has had a long history of GI symptoms.  Has had trouble with dysphagia even as a young adult and teenager.    Back in 2019 she had an upper endoscopy, and was diagnosed with eosinophilic esophagitis.  Biopsies confirmed this.  She also had a EOE like appearance, and reportedly also had a dilation done at 1 point.    She had allergy testing after that.  Has not been on a PPI or an acid reflux regimen.  She has adhered to the EOE diet, but despite that she feels that she is having more dysphagia lately, and sometimes some reflux and regurgitation symptoms as well.    Her dermatologist mention Dupixent.    She also has abdominal pain which is located in the right lower quadrant.  No specific aggravating or relieving factors or triggers.  Has been occurring on and off for the past 2 years or so.    She also had a colonoscopy done in 2019 which was done for the purpose of colorectal cancer screening predominantly, and was noted to be unremarkable.  No polyps were noted on that exam.  There were no mucosal biopsies obtained.    She has had trouble with diarrhea, and irregular bowels, which has been better once she started her dietary restrictions, but now seem to be recurring.    Subjective      Past Medical History:   Diagnosis Date    Arthritis     Eosinophilic esophagitis     Hashimoto's disease     Migraines     Vaginal atrophy        Past Surgical History:   Procedure Laterality Date    BACK SURGERY      Fusion L5-S1    CHOLECYSTECTOMY      HYSTERECTOMY      partial       Family History   Problem Relation Age of Onset    Breast cancer Mother     Atrial  fibrillation Mother     Hypoparathyroidism Mother     Hypertension Mother     Cancer Mother     Heart disease Mother     Thyroid disease Father     Liver cancer Sister     Stroke Sister     Heart disease Sister     Sjogren's syndrome Sister     Clotting disorder Brother        Social History     Socioeconomic History    Marital status:    Tobacco Use    Smoking status: Never     Passive exposure: Never    Smokeless tobacco: Never   Vaping Use    Vaping status: Never Used   Substance and Sexual Activity    Alcohol use: Never    Drug use: Never    Sexual activity: Not Currently     Birth control/protection: Hysterectomy         Current Outpatient Medications:     EPINEPHrine (EPIPEN) 0.3 MG/0.3ML solution auto-injector injection, INJECT AS NEEDED FOR SYSTEMIC ALLERGIC REACTION, Disp: , Rfl:     levothyroxine (SYNTHROID, LEVOTHROID) 100 MCG tablet, Take 1 tablet by mouth Daily. (Patient taking differently: Take 1 tablet by mouth Daily. Take 100 MCG daily, except for Sundays.), Disp: 90 tablet, Rfl: 3    multivitamin (MULTI-VITAMIN DAILY PO), 1 tablet Daily., Disp: , Rfl:     polyethylene glycol (MIRALAX) 17 g packet, Take 17 g by mouth Daily., Disp: 1 each, Rfl: 3    rizatriptan (MAXALT) 10 MG tablet, Take 1 tablet by mouth 1 (One) Time As Needed for Migraine., Disp: 27 tablet, Rfl: 3    sodium-potassium-magnesium sulfates (Suprep Bowel Prep Kit) 17.5-3.13-1.6 GM/177ML solution oral solution, Take 1 bottle by mouth Every 12 (Twelve) Hours for 1 day., Disp: 354 mL, Rfl: 0    Allergies   Allergen Reactions    Bee Venom Anaphylaxis    Nuts Anaphylaxis    Potato Anaphylaxis    Soybean-Containing Drug Products Anaphylaxis       Review of Systems   Constitutional:  Negative for unexpected weight loss.   HENT:  Positive for trouble swallowing.    Gastrointestinal:  Positive for abdominal pain, constipation, diarrhea and indigestion. Negative for abdominal distention, anal bleeding, blood in stool, nausea, rectal pain,  vomiting and GERD.       The following portions of the patient's history were reviewed and updated as appropriate: allergies, current medications, past family history, past medical history, past social history, past surgical history and problem list.    Objective     Physical Exam:  Vitals:    03/13/24 0948   BP: 120/80   Pulse: 100   SpO2: 98%   Weight: 75.8 kg (167 lb 3.2 oz)       Physical Exam  Constitutional:       General: She is not in acute distress.     Appearance: Normal appearance.   HENT:      Head: Normocephalic and atraumatic.      Nose: Nose normal.      Mouth/Throat:      Mouth: Mucous membranes are moist.   Eyes:      General: No scleral icterus.     Conjunctiva/sclera: Conjunctivae normal.   Cardiovascular:      Rate and Rhythm: Normal rate.   Pulmonary:      Effort: Pulmonary effort is normal. No respiratory distress.   Abdominal:      General: There is no distension.      Tenderness: There is no abdominal tenderness. There is no guarding.   Musculoskeletal:         General: No deformity or signs of injury.      Cervical back: Neck supple. No rigidity.   Skin:     Capillary Refill: Capillary refill takes less than 2 seconds.      Coloration: Skin is not jaundiced or pale.   Neurological:      General: No focal deficit present.      Mental Status: She is alert and oriented to person, place, and time.   Psychiatric:         Mood and Affect: Mood normal.         Behavior: Behavior normal.         Results Review:   I have reviewed the patient's new clinical and imaging results and agree with the interpretation.     Office Visit on 02/05/2024   Component Date Value Ref Range Status    Color 02/05/2024 Yellow  Yellow, Straw, Dark Yellow, Kadi Final    Clarity, UA 02/05/2024 Clear  Clear Final    Specific Gravity  02/05/2024 1.005  1.005 - 1.030 Final    pH, Urine 02/05/2024 7.0  5.0 - 8.0 Final    Leukocytes 02/05/2024 Negative  Negative Final    Nitrite, UA 02/05/2024 Negative  Negative Final     Protein, POC 02/05/2024 Negative  Negative mg/dL Final    Glucose, UA 02/05/2024 Negative  Negative mg/dL Final    Ketones, UA 02/05/2024 Negative  Negative Final    Urobilinogen, UA 02/05/2024 Normal  Normal, 0.2 E.U./dL Final    Bilirubin 02/05/2024 Negative  Negative Final    Blood, UA 02/05/2024 Negative  Negative Final    Lot Number 02/05/2024 [id number]   Final    Expiration Date 02/05/2024 07/13/2024   Final      CT Abdomen Pelvis With & Without Contrast    Result Date: 1/17/2024  No acute disease. Authenticated and Electronically Signed by Michelet Ibanez M.D. on 01/17/2024 08:41:21 PM     Assessment / Plan      Assessment & Plan:  Diagnoses and all orders for this visit:    1. Esophageal dysphagia (Primary)  -     Case Request; Standing  -     Case Request    2. Esophagitis, eosinophilic  -     Case Request; Standing  -     Case Request    3. Abdominal pain, RLQ  -     Case Request; Standing  -     Case Request    4. Change in bowel habits  -     Case Request; Standing  -     Case Request    Other orders  -     Follow Anesthesia Guidelines / Protocol; Standing  -     Follow Anesthesia Guidelines / Protocol; Future  -     Obtain Informed Consent; Future  -     Verify NPO; Standing  -     sodium-potassium-magnesium sulfates (Suprep Bowel Prep Kit) 17.5-3.13-1.6 GM/177ML solution oral solution; Take 1 bottle by mouth Every 12 (Twelve) Hours for 1 day.  Dispense: 354 mL; Refill: 0        Given change in bowel habits, we'll plan for a diagnostic colonoscopy to evaluate the above symptoms. Broad differential diagnosis. Includes, but is not limited to colitis, ileitis, structural pathology, strictures, and inflammatory disease, such as Crohn's or ulcerative colitis, polyps.  There is also a possibility of eosinophilic ileitis or colitis.    Plan for TI and random colon mucosal biopsies.    Will also bring her in for an upper endoscopy.  Needs repeat assessment for EOE, including biopsies for eosinophil count.    She  would likely require a reflux regimen as well.  Would suggest Pepcid.    Plan for EGD and colonoscopy with monitored anesthesia care. Counseled in detail regarding the risks, benefits and alternatives of the procedure, including but not limited to perforation, bleeding, infection, post-operative pain, complications from anesthesia, aspiration, cardiac decompensation, need for further procedures or surgery, which may occur in approximately 1 in 1000 procedures. Counseled also that endoscopy and colonoscopy are not perfect tests, and there is a possibility of missed diagnoses, including but not limited to polyps or cancer. Counseled regarding pre procedural instructions. Also discussed bowel preparation. Counseled regarding potential, albeit rare complications with bowel preparation specific to this patients medical history and medications, including but not limited to renal insufficiency/failure, electrolyte abnormalities, which may lead to other complications. Hydration is encouraged. Written instructions provided. Instructed to arrange for a ride home for the day of procedure. Patient is in agreement with the above plan and voices understanding of the plan as well as the associated risks and the alternative evaluation/treatment/medication options.     Suprep    Follow Up:   Return for Follow-up 3-6 months post procedure.    Latricia Hernandez MD  [facility] [place]    3/13/2024  10:58 EDT    Part of this note may be an electronic transcription/translation of spoken language to printed text using the Dragon Dictation System.

## 2024-03-14 ENCOUNTER — TELEPHONE (OUTPATIENT)
Dept: GASTROENTEROLOGY | Facility: CLINIC | Age: 59
End: 2024-03-14
Payer: COMMERCIAL

## 2024-03-14 PROBLEM — R13.19 ESOPHAGEAL DYSPHAGIA: Status: ACTIVE | Noted: 2024-03-13

## 2024-03-14 PROBLEM — R10.31 ABDOMINAL PAIN, RLQ: Status: ACTIVE | Noted: 2024-03-13

## 2024-03-14 PROBLEM — K20.0 ESOPHAGITIS, EOSINOPHILIC: Status: ACTIVE | Noted: 2024-03-13

## 2024-03-14 PROBLEM — R19.4 CHANGE IN BOWEL HABITS: Status: ACTIVE | Noted: 2024-03-13

## 2024-03-14 NOTE — TELEPHONE ENCOUNTER
Patient is scheduled for colonoscopy/upper endoscopy on 4/18/24 with Dr. Hernandez. Per Harjit at the [facility] patient called and wants to hold off on procedure. I have cancelled for now and she can call back and reschedule anytime.

## 2024-04-01 ENCOUNTER — TELEPHONE (OUTPATIENT)
Dept: GASTROENTEROLOGY | Facility: CLINIC | Age: 59
End: 2024-04-01
Payer: COMMERCIAL

## 2024-04-01 NOTE — TELEPHONE ENCOUNTER
[facility] staff attempted to follow warm transfer process and was unsuccessful     Caller: Agata Talbert    Relationship to patient: Self    Best call back number: [phone]     Patient is needing: PT IS TRYING TO GET AN ESTIMATE FOR HER UPCOMING PROCEDURE EGD/ COLON WITH DR DUKES ON 4/18. PT SPOKE WITH ESTIMATES AND BILLING DEPARTMENT AND WAS ONLY GIVEN THE ESTIMATE FOR THE PROCEDURES AND NOT THE ANESTHESIOLOGIST OR DR DUKES. PT IS NEEDING TO KNOW WHOM SHE NEEDS TO SPEAK WITH TO GET THE TOTAL BILL FOR HER PROCEDURE.

## 2024-04-18 ENCOUNTER — ANESTHESIA EVENT (OUTPATIENT)
Dept: GASTROENTEROLOGY | Facility: HOSPITAL | Age: 59
End: 2024-04-18
Payer: COMMERCIAL

## 2024-04-18 ENCOUNTER — HOSPITAL ENCOUNTER (OUTPATIENT)
Facility: HOSPITAL | Age: 59
Setting detail: HOSPITAL OUTPATIENT SURGERY
Discharge: HOME | End: 2024-04-18
Attending: INTERNAL MEDICINE | Admitting: INTERNAL MEDICINE
Payer: COMMERCIAL

## 2024-04-18 ENCOUNTER — ANESTHESIA (OUTPATIENT)
Dept: GASTROENTEROLOGY | Facility: HOSPITAL | Age: 59
End: 2024-04-18
Payer: COMMERCIAL

## 2024-04-18 VITALS
RESPIRATION RATE: 18 BRPM | SYSTOLIC BLOOD PRESSURE: 107 MMHG | BODY MASS INDEX: 23.7 KG/M2 | TEMPERATURE: 98.1 F | OXYGEN SATURATION: 98 % | WEIGHT: 160 LBS | DIASTOLIC BLOOD PRESSURE: 52 MMHG | HEART RATE: 66 BPM | HEIGHT: 69 IN

## 2024-04-18 DIAGNOSIS — R19.4 CHANGE IN BOWEL HABITS: ICD-10-CM

## 2024-04-18 DIAGNOSIS — R13.19 ESOPHAGEAL DYSPHAGIA: ICD-10-CM

## 2024-04-18 PROCEDURE — 43239 EGD BIOPSY SINGLE/MULTIPLE: CPT | Performed by: INTERNAL MEDICINE

## 2024-04-18 PROCEDURE — 25810000003 LACTATED RINGERS PER 1000 ML: Performed by: INTERNAL MEDICINE

## 2024-04-18 PROCEDURE — 45380 COLONOSCOPY AND BIOPSY: CPT | Performed by: INTERNAL MEDICINE

## 2024-04-18 PROCEDURE — 25010000002 PROPOFOL 200 MG/20ML EMULSION: Performed by: NURSE ANESTHETIST, CERTIFIED REGISTERED

## 2024-04-18 RX ORDER — SODIUM CHLORIDE, SODIUM LACTATE, POTASSIUM CHLORIDE, CALCIUM CHLORIDE 600; 310; 30; 20 MG/100ML; MG/100ML; MG/100ML; MG/100ML
1000 INJECTION, SOLUTION INTRAVENOUS CONTINUOUS
Status: DISCONTINUED | OUTPATIENT
Start: 2024-04-18 | End: 2024-04-18 | Stop reason: HOSPADM

## 2024-04-18 RX ORDER — SODIUM CHLORIDE 0.9 % (FLUSH) 0.9 %
10 SYRINGE (ML) INJECTION AS NEEDED
Status: DISCONTINUED | OUTPATIENT
Start: 2024-04-18 | End: 2024-04-18 | Stop reason: HOSPADM

## 2024-04-18 RX ORDER — LIDOCAINE HCL/PF 100 MG/5ML
SYRINGE (ML) INJECTION AS NEEDED
Status: DISCONTINUED | OUTPATIENT
Start: 2024-04-18 | End: 2024-04-18 | Stop reason: SURG

## 2024-04-18 RX ORDER — PROPOFOL 10 MG/ML
INJECTION, EMULSION INTRAVENOUS AS NEEDED
Status: DISCONTINUED | OUTPATIENT
Start: 2024-04-18 | End: 2024-04-18 | Stop reason: SURG

## 2024-04-18 RX ADMIN — PROPOFOL 100 MG: 10 INJECTION, EMULSION INTRAVENOUS at 09:31

## 2024-04-18 RX ADMIN — PROPOFOL 100 MG: 10 INJECTION, EMULSION INTRAVENOUS at 09:24

## 2024-04-18 RX ADMIN — SODIUM CHLORIDE, POTASSIUM CHLORIDE, SODIUM LACTATE AND CALCIUM CHLORIDE 1000 ML: 600; 310; 30; 20 INJECTION, SOLUTION INTRAVENOUS at 09:09

## 2024-04-18 RX ADMIN — Medication 40 MG: at 09:24

## 2024-04-18 RX ADMIN — PROPOFOL 100 MG: 10 INJECTION, EMULSION INTRAVENOUS at 09:35

## 2024-04-18 RX ADMIN — PROPOFOL 100 MG: 10 INJECTION, EMULSION INTRAVENOUS at 09:43

## 2024-04-18 NOTE — ANESTHESIA POSTPROCEDURE EVALUATION
Patient: Agata Talbert    Procedure Summary       Date: 04/18/24 Room / Location: [facility] / [facility]    Anesthesia Start: 0921 Anesthesia Stop: 0947    Procedures:       COLONOSCOPY WITH BIOPSY (Anus)      ESOPHAGOSCOPY WITH BIOPSY (Esophagus) Diagnosis:     Surgeons: Latricia Hernandez MD Provider: Rod Trejo CRNA    Anesthesia Type: MAC ASA Status: 2            Anesthesia Type: MAC    Vitals  No vitals data found for the desired time range.          Post Anesthesia Care and Evaluation    Patient location during evaluation: PHASE II  Patient participation: complete - patient participated  Level of consciousness: awake and alert  Pain score: 0  Pain management: satisfactory to patient    Airway patency: patent  Anesthetic complications: No anesthetic complications  PONV Status: none  Cardiovascular status: acceptable and stable  Respiratory status: acceptable and spontaneous ventilation  Hydration status: acceptable    Comments: Vitals signs as noted in nursing documentation as per protocol.

## 2024-04-18 NOTE — H&P
Pre Procedure History and Physical      Date of Procedure: 2024  Patient Name: Agata Talbert  MRN: [id number]  : 1965     Referring provider: Latricia Hernandez MD    History of Present Illness: Patient here for scheduled outpatient endoscopy. See below and prior GI clinic documentation for further details.      Subjective     Past Medical History:   Diagnosis Date    Arthritis     Eosinophilic esophagitis     Hashimoto's disease     Migraines     Vaginal atrophy        Past Surgical History:   Procedure Laterality Date    BACK SURGERY      Fusion L5-S1    CHOLECYSTECTOMY      HYSTERECTOMY      partial       Family History   Problem Relation Age of Onset    Breast cancer Mother     Atrial fibrillation Mother     Hypoparathyroidism Mother     Hypertension Mother     Cancer Mother     Heart disease Mother     Thyroid disease Father     Liver cancer Sister     Stroke Sister     Heart disease Sister     Sjogren's syndrome Sister     Clotting disorder Brother        Social History     Socioeconomic History    Marital status:    Tobacco Use    Smoking status: Never     Passive exposure: Never    Smokeless tobacco: Never   Vaping Use    Vaping status: Never Used   Substance and Sexual Activity    Alcohol use: Never    Drug use: Never    Sexual activity: Not Currently     Birth control/protection: Hysterectomy       No current facility-administered medications for this encounter.    Allergies   Allergen Reactions    Bee Venom Anaphylaxis    Nuts Anaphylaxis    Potato Anaphylaxis    Soybean-Containing Drug Products Anaphylaxis       Review of Systems  Negative, except as below     The following portions of the patient's history were reviewed and updated as appropriate: allergies, current medications, past family history, past medical history, past social history, past surgical history and problem list.    Objective     There were no vitals filed for this visit.    Physical  Exam  Constitutional:       General: She is not in acute distress.     Appearance: Normal appearance.   HENT:      Head: Normocephalic and atraumatic.      Nose: Nose normal.      Mouth/Throat:      Mouth: Mucous membranes are moist.   Eyes:      General: No scleral icterus.     Conjunctiva/sclera: Conjunctivae normal.   Cardiovascular:      Rate and Rhythm: Normal rate.   Pulmonary:      Effort: Pulmonary effort is normal. No respiratory distress.   Abdominal:      General: There is no distension.      Tenderness: There is no abdominal tenderness. There is no guarding.   Musculoskeletal:         General: No deformity or signs of injury.      Cervical back: Neck supple. No rigidity.   Skin:     Capillary Refill: Capillary refill takes less than 2 seconds.      Coloration: Skin is not jaundiced or pale.   Neurological:      General: No focal deficit present.      Mental Status: She is alert and oriented to person, place, and time.   Psychiatric:         Mood and Affect: Mood normal.         Behavior: Behavior normal.           Assessment / Plan      Assessment/Recommendations:   Active Problems:    Esophageal dysphagia    Esophagitis, eosinophilic    Abdominal pain, RLQ    Change in bowel habits      Plan:  EGD/Colon    See GI clinic note/documentation for further details.      Latricia Hernandez MD  [facility] [place]  4/18/2024  08:32 EDT    Part of this note may be an electronic transcription/translation of spoken language to printed text using the Dragon Dictation System.

## 2024-04-18 NOTE — DISCHARGE INSTRUCTIONS
To assist you in voiding:  Drink plenty of fluids  Listen to running water while attempting to void.    If you are unable to urinate and you have an uncomfortable urge to void or it has been   6 hours since you were discharged, return to the Emergency Room    No pushing, pulling, tugging,  heavy lifting, or strenuous activity.  No major decision making, driving, or drinking alcoholic beverages for 24 hours. ( due to the medications you have  received)  Always use good hand hygiene/washing techniques.  NO driving while taking pain medications.

## 2024-04-18 NOTE — ANESTHESIA PREPROCEDURE EVALUATION
Anesthesia Evaluation     Patient summary reviewed and Nursing notes reviewed   NPO Solid Status: > 8 hours  NPO Liquid Status: > 8 hours           Airway   Mallampati: II  TM distance: >3 FB  Neck ROM: full  Possible difficult intubation  Dental - normal exam     Pulmonary - normal exam   Cardiovascular - normal exam        Neuro/Psych  (+) headaches  GI/Hepatic/Renal/Endo    (+) thyroid problem hypothyroidism    Musculoskeletal     Abdominal  - normal exam   Substance History      OB/GYN          Other                    Anesthesia Plan    ASA 2     MAC     intravenous induction     Anesthetic plan, risks, benefits, and alternatives have been provided, discussed and informed consent has been obtained with: patient.  Pre-procedure education provided  Plan discussed with CRNA.    CODE STATUS:

## 2024-04-19 ENCOUNTER — TELEPHONE (OUTPATIENT)
Dept: GASTROENTEROLOGY | Facility: CLINIC | Age: 59
End: 2024-04-19
Payer: COMMERCIAL

## 2024-04-19 DIAGNOSIS — K21.00 GASTROESOPHAGEAL REFLUX DISEASE WITH ESOPHAGITIS WITHOUT HEMORRHAGE: ICD-10-CM

## 2024-04-19 DIAGNOSIS — K20.0 ESOPHAGITIS, EOSINOPHILIC: Primary | ICD-10-CM

## 2024-04-19 LAB — REF LAB TEST METHOD: NORMAL

## 2024-04-19 RX ORDER — FAMOTIDINE 20 MG/1
20 TABLET, FILM COATED ORAL 2 TIMES DAILY
Qty: 180 TABLET | Refills: 1 | Status: SHIPPED | OUTPATIENT
Start: 2024-04-19 | End: 2024-10-16

## 2024-04-19 NOTE — PROGRESS NOTES
Please give the patient the following message:  ----- Results -----  Duodenal biopsies are unremarkable.  Gastric biopsies are negative for H. pylori.  Esophageal biopsies show up to 21 eosinophils per high-power field, which is diagnostic of eosinophilic esophagitis.  Terminal ileum and colon biopsies are unremarkable.  Negative for Crohn's disease or colitis.

## 2024-04-19 NOTE — TELEPHONE ENCOUNTER
----- Message from Shefali Dockery MA sent at 4/19/2024  2:07 PM EDT -----    ----- Message -----  From: Latricia Hernandez MD  Sent: 4/19/2024   1:26 PM EDT  To: [facility]    Please give the patient the following message:  ----- Results -----  Duodenal biopsies are unremarkable.  Gastric biopsies are negative for H. pylori.  Esophageal biopsies show up to 21 eosinophils per high-power field, which is diagnostic of eosinophilic esophagitis.  Terminal ileum and colon biopsies are unremarkable.  Negative for Crohn's disease or colitis.

## 2024-04-26 ENCOUNTER — PATIENT MESSAGE (OUTPATIENT)
Dept: INTERNAL MEDICINE | Facility: CLINIC | Age: 59
End: 2024-04-26
Payer: COMMERCIAL

## 2024-04-26 DIAGNOSIS — R10.32 LEFT LOWER QUADRANT ABDOMINAL PAIN: Primary | ICD-10-CM

## 2024-04-26 NOTE — TELEPHONE ENCOUNTER
From: Agata Talbert  To: Diogo Monge  Sent: 4/26/2024 6:20 AM EDT  Subject: Need referral    I need a referral for check up with gynecologist in [place] please. Thank you.

## 2024-05-07 ENCOUNTER — TELEPHONE (OUTPATIENT)
Dept: GASTROENTEROLOGY | Facility: CLINIC | Age: 59
End: 2024-05-07
Payer: COMMERCIAL

## 2024-05-08 ENCOUNTER — OFFICE VISIT (OUTPATIENT)
Dept: OBSTETRICS AND GYNECOLOGY | Facility: CLINIC | Age: 59
End: 2024-05-08
Payer: COMMERCIAL

## 2024-05-08 VITALS
WEIGHT: 163.8 LBS | HEIGHT: 69 IN | BODY MASS INDEX: 24.26 KG/M2 | SYSTOLIC BLOOD PRESSURE: 104 MMHG | DIASTOLIC BLOOD PRESSURE: 78 MMHG

## 2024-05-08 DIAGNOSIS — G89.29 CHRONIC RLQ PAIN: ICD-10-CM

## 2024-05-08 DIAGNOSIS — R10.31 RIGHT LOWER QUADRANT ABDOMINAL PAIN: Primary | ICD-10-CM

## 2024-05-08 DIAGNOSIS — R10.31 CHRONIC RLQ PAIN: ICD-10-CM

## 2024-05-08 RX ORDER — DICYCLOMINE HYDROCHLORIDE 10 MG/1
10 CAPSULE ORAL 4 TIMES DAILY
Qty: 120 CAPSULE | Refills: 1 | Status: SHIPPED | OUTPATIENT
Start: 2024-05-08 | End: 2025-05-08

## 2024-05-09 ENCOUNTER — PATIENT ROUNDING (BHMG ONLY) (OUTPATIENT)
Dept: OBSTETRICS AND GYNECOLOGY | Facility: CLINIC | Age: 59
End: 2024-05-09
Payer: COMMERCIAL

## 2024-05-09 LAB
ESTRADIOL SERPL-MCNC: <5 PG/ML
FSH SERPL-ACNC: 91.9 MIU/ML

## 2024-05-13 ENCOUNTER — HOSPITAL ENCOUNTER (OUTPATIENT)
Dept: MAMMOGRAPHY | Facility: HOSPITAL | Age: 59
Discharge: HOME | End: 2024-05-13
Payer: COMMERCIAL

## 2024-05-13 ENCOUNTER — APPOINTMENT (OUTPATIENT)
Dept: OTHER | Facility: HOSPITAL | Age: 59
End: 2024-05-13
Payer: COMMERCIAL

## 2024-05-13 DIAGNOSIS — Z92.89 HX OF MAMMOGRAM: ICD-10-CM

## 2024-05-13 DIAGNOSIS — Z12.31 ENCOUNTER FOR SCREENING MAMMOGRAM FOR MALIGNANT NEOPLASM OF BREAST: ICD-10-CM

## 2024-05-13 PROCEDURE — 77067 SCR MAMMO BI INCL CAD: CPT

## 2024-05-13 PROCEDURE — 77063 BREAST TOMOSYNTHESIS BI: CPT

## 2024-05-17 ENCOUNTER — OFFICE VISIT (OUTPATIENT)
Dept: INTERNAL MEDICINE | Facility: CLINIC | Age: 59
End: 2024-05-17
Payer: COMMERCIAL

## 2024-05-17 ENCOUNTER — TELEPHONE (OUTPATIENT)
Dept: INTERNAL MEDICINE | Facility: CLINIC | Age: 59
End: 2024-05-17

## 2024-05-17 VITALS
WEIGHT: 164 LBS | TEMPERATURE: 97 F | BODY MASS INDEX: 24.29 KG/M2 | HEIGHT: 69 IN | HEART RATE: 85 BPM | OXYGEN SATURATION: 99 % | DIASTOLIC BLOOD PRESSURE: 70 MMHG | SYSTOLIC BLOOD PRESSURE: 102 MMHG | RESPIRATION RATE: 16 BRPM

## 2024-05-17 DIAGNOSIS — R10.31 RIGHT LOWER QUADRANT ABDOMINAL PAIN: Primary | ICD-10-CM

## 2024-05-17 DIAGNOSIS — Z00.00 ROUTINE GENERAL MEDICAL EXAMINATION AT A HEALTH CARE FACILITY: ICD-10-CM

## 2024-05-17 DIAGNOSIS — R53.83 FATIGUE, UNSPECIFIED TYPE: ICD-10-CM

## 2024-05-17 DIAGNOSIS — G43.E11 INTRACTABLE CHRONIC MIGRAINE WITH AURA WITH STATUS MIGRAINOSUS: ICD-10-CM

## 2024-05-17 PROCEDURE — 99214 OFFICE O/P EST MOD 30 MIN: CPT | Performed by: INTERNAL MEDICINE

## 2024-05-17 NOTE — PROGRESS NOTES
Subjective     Patient ID: Agata Talbert is a 59 y.o. female. Patient is here for management of multiple medical problems.     Chief Complaint   Patient presents with    Arthritis     History of Present Illness   Rosas dull rlq pain. Had for a year.  CT done.   Seen Dr Garcia.        Lower back pain and has apt with Dr Lopez. Hard montiel intact. Exercise.       Pain in ab continued.   Colonoscopy and EGD. Has f/u in June.   EGD with EOE.   Seen Dr Wong.   Meds are unaffordable. Med program is to complex and not able to be done.     GYn Diaz didn't see anything on U/S.     Pt was started on diclomine. It is helping. Likely gi related. If ever getting worse or obstructed would consider adhesions or endometriosis.     Pt under a lot of stress. Migrains worse.  Pt taking 2 maxalt most days of the week.    Pt is concerned it is to much and it is.                                   The following portions of the patient's history were reviewed and updated as appropriate: allergies, current medications, past family history, past medical history, past social history, past surgical history and problem list.    Review of Systems    Current Outpatient Medications:     dicyclomine (BENTYL) 10 MG capsule, Take 1 capsule by mouth 4 (Four) Times a Day., Disp: 120 capsule, Rfl: 1    EPINEPHrine (EPIPEN) 0.3 MG/0.3ML solution auto-injector injection, INJECT AS NEEDED FOR SYSTEMIC ALLERGIC REACTION, Disp: , Rfl:     famotidine (Pepcid) 20 MG tablet, Take 1 tablet by mouth 2 (Two) Times a Day for 180 days., Disp: 180 tablet, Rfl: 1    levothyroxine (SYNTHROID, LEVOTHROID) 100 MCG tablet, Take 1 tablet by mouth Daily. (Patient taking differently: Take 1 tablet by mouth Daily. Take 100 MCG daily, except for Sundays.), Disp: 90 tablet, Rfl: 3    multivitamin (MULTI-VITAMIN DAILY PO), 1 tablet Daily., Disp: , Rfl:     polyethylene glycol (MIRALAX) 17 g packet, Take 17 g by mouth Daily., Disp: 1 each, Rfl: 3    rizatriptan (MAXALT) 10 MG  "tablet, Take 1 tablet by mouth 1 (One) Time As Needed for Migraine., Disp: 27 tablet, Rfl: 3    Rimegepant Sulfate (NURTEC) 75 MG tablet dispersible tablet, Take 1 tablet by mouth Daily., Disp: 30 tablet, Rfl: 5    Objective      Blood pressure 102/70, pulse 85, temperature 97 °F (36.1 °C), resp. rate 16, height 175.3 cm (69\"), weight 74.4 kg (164 lb), SpO2 99%.    BMI is within normal parameters. No other follow-up for BMI required.       Physical Exam     General Appearance:    Alert, cooperative, no distress, appears stated age   Head:    Normocephalic, without obvious abnormality, atraumatic   Eyes:    PERRL, conjunctiva/corneas clear, EOM's intact   Ears:    Normal TM's and external ear canals, both ears   Nose:   Nares normal, septum midline, mucosa normal, no drainage   or sinus tenderness   Throat:   Lips, mucosa, and tongue normal; teeth and gums normal   Neck:   Supple, symmetrical, trachea midline, no adenopathy;        thyroid:  No enlargement/tenderness/nodules; no carotid    bruit or JVD   Back:     Symmetric, no curvature, ROM normal, no CVA tenderness   Lungs:     Clear to auscultation bilaterally, respirations unlabored   Chest wall:    No tenderness or deformity   Heart:    Regular rate and rhythm, S1 and S2 normal, no murmur,        rub or gallop   Abdomen:     Soft, non-tender, bowel sounds active all four quadrants,     no masses, no organomegaly   Extremities:   Extremities normal, atraumatic, no cyanosis or edema   Pulses:   2+ and symmetric all extremities   Skin:   Skin color, texture, turgor normal, no rashes or lesions   Lymph nodes:   Cervical, supraclavicular, and axillary nodes normal   Neurologic:   CNII-XII intact. Normal strength, sensation and reflexes       throughout      Results for orders placed or performed in visit on 05/08/24   Follicle Stimulating Hormone    Specimen: Blood   Result Value Ref Range    FSH 91.9 mIU/mL   Estradiol    Specimen: Blood   Result Value Ref Range    " Estradiol <5.0 pg/mL         Assessment & Plan   Rosas dull rlq pain. Had for a year.  CT done.   Seen Dr Garcia.        Lower back pain and has apt with Dr Lopez. Hard montiel intact. Exercise.       Pain in ab continued.   Colonoscopy and EGD. Has f/u in June.   EGD with EOE.   Seen Dr Wong.   Meds are unaffordable. Med program is to complex and not able to be done.     GYn Diaz didn't see anything on U/S.     Pt was started on diclomine. It is helping. Likely gi related. If ever getting worse or obstructed would consider adhesions or endometriosis.     Pt under a lot of stress. Migrains worse.  Pt taking 2 maxalt most days of the week.    Pt is concerned it is to much and it is.   Right side opthalmic branch Migrains.     Diagnoses and all orders for this visit:    1. Right lower quadrant abdominal pain (Primary)  -     Comprehensive Metabolic Panel  -     TSH  -     T4, Free  -     Vitamin B12  -     CBC & Differential  -     Lipid Panel  -     Vitamin D,25-Hydroxy  -     Hemoglobin A1c  -     Vitamin B6    2. Intractable chronic migraine with aura with status migrainosus  -     Comprehensive Metabolic Panel  -     TSH  -     T4, Free  -     Vitamin B12  -     CBC & Differential  -     Lipid Panel  -     Vitamin D,25-Hydroxy  -     Hemoglobin A1c  -     Vitamin B6    3. Fatigue, unspecified type  -     Comprehensive Metabolic Panel  -     TSH  -     T4, Free  -     Vitamin B12  -     CBC & Differential  -     Lipid Panel  -     Vitamin D,25-Hydroxy  -     Hemoglobin A1c  -     Vitamin B6    4. Routine general medical examination at a health care facility  -     Comprehensive Metabolic Panel  -     TSH  -     T4, Free  -     Vitamin B12  -     CBC & Differential  -     Lipid Panel  -     Vitamin D,25-Hydroxy  -     Hemoglobin A1c  -     Vitamin B6    Other orders  -     Rimegepant Sulfate (NURTEC) 75 MG tablet dispersible tablet; Take 1 tablet by mouth Daily.  Dispense: 30 tablet; Refill: 5      Return in about 6  weeks (around 6/28/2024).          There are no Patient Instructions on file for this visit.     Diogo Monge MD    Assessment & Plan       Answers submitted by the patient for this visit:  Primary Reason for Visit (Submitted on 5/10/2024)  What is the primary reason for your visit?: Abdominal Pain

## 2024-05-17 NOTE — TELEPHONE ENCOUNTER
Caller: Agata Talbert    Relationship: Self    Best call back number: [phone]    Who are you requesting to speak with (clinical staff, provider,  specific staff member): PEDRO    What was the call regarding: PATIENT CALLED STAITNG THAT THE PHARMACY FAXED OVER [facility]  PATIENT CALLED TO CHECK IF PEDRO RECEIVED THIS FAX

## 2024-05-17 NOTE — TELEPHONE ENCOUNTER
Prior Authorization was started on Nurtec 75mg tablets, waiting for outcome,  Key fo Cover My Meds: [id number]

## 2024-05-21 ENCOUNTER — TELEPHONE (OUTPATIENT)
Dept: INTERNAL MEDICINE | Facility: CLINIC | Age: 59
End: 2024-05-21
Payer: COMMERCIAL

## 2024-05-21 NOTE — TELEPHONE ENCOUNTER
Went to do a pa     Has already been done and its been denied    Denied on May 18  Denied request. A notification with additional details has been faxed to your office.    Key [id number]

## 2024-06-11 ENCOUNTER — OFFICE VISIT (OUTPATIENT)
Dept: GASTROENTEROLOGY | Facility: CLINIC | Age: 59
End: 2024-06-11
Payer: COMMERCIAL

## 2024-06-11 VITALS
HEIGHT: 69 IN | OXYGEN SATURATION: 98 % | DIASTOLIC BLOOD PRESSURE: 80 MMHG | BODY MASS INDEX: 24.14 KG/M2 | WEIGHT: 163 LBS | HEART RATE: 82 BPM | SYSTOLIC BLOOD PRESSURE: 102 MMHG

## 2024-06-11 DIAGNOSIS — R10.31 ABDOMINAL PAIN, RLQ: Primary | ICD-10-CM

## 2024-06-11 DIAGNOSIS — K20.0 ESOPHAGITIS, EOSINOPHILIC: ICD-10-CM

## 2024-06-11 RX ORDER — FAMOTIDINE 20 MG/1
20 TABLET, FILM COATED ORAL 2 TIMES DAILY
Qty: 180 TABLET | Refills: 1 | Status: SHIPPED | OUTPATIENT
Start: 2024-06-11 | End: 2024-12-08

## 2024-06-11 NOTE — PROGRESS NOTES
Follow Up Note     Date: 2024   Patient Name: Agata Talbert  MRN: [id number]  : 1965     Referring Physician: Diogo Monge MD    Chief Complaint:    Chief Complaint   Patient presents with    Follow-up     F/u colonoscopy, endoscopy       Interval History:   2024  Agata Talbert is a 59 y.o. female who is here today for follow up.    She has a confirmed diagnosis of eosinophilic esophagitis based on recent EGD.    Reviewed EGD and colonoscopy reports.    She complains of right lower quadrant abdominal pain.  She has had this dull pain for some time, however it became sharp sometime ago, and she debated even going to the emergency room.    CT scan back in January - no concerning etiology noted.    She has had a evaluation by OB GYN as well, and it was unremarkable.    No obvious pathology noted on colonoscopy as well.  Terminal ileum was intubated and biopsies are within normal limits.    She was given a trial of dicyclomine, and it seems to have helped.  She is taking 1 a day, and it seems to be controlling the pain well.    A review of her CT indicated that there was a large stool burden.  She does not have very irregular bowel movements.  She has been taking MiraLAX for some time, albeit not every day lately.  Has been trying to take it every other day.  Sometimes she has a feeling of incomplete evacuation as well.    She is also dealing with a lot of stressors regarding her son's illness.    Reviewed prior food allergy testing.  23 and me genetic test done and revealed that she is a M Z heterozygous for alpha-1 antitrypsin deficiency.  Does not smoke does not drink.    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      Subjective      Past Medical History:   Diagnosis Date    Allergic all my life    Arthritis     Cholelithiasis removed     Eosinophilic esophagitis     GERD (gastroesophageal reflux disease)     Hashimoto's disease      Hypothyroidism 1998    Irritable bowel syndrome ?    Low back pain     Migraines     PONV (postoperative nausea and vomiting)     Vaginal atrophy     Varicella 1960's     Past Surgical History:   Procedure Laterality Date    ABDOMINAL SURGERY      BACK SURGERY  2022    Fusion L5-S1    BREAST BIOPSY Left 2012    CHOLECYSTECTOMY      COLONOSCOPY N/A 04/18/2024    Procedure: COLONOSCOPY WITH BIOPSY;  Surgeon: Latricia Hernandez MD;  Location: [facility] ENDOSCOPY;  Service: Gastroenterology;  Laterality: N/A;    DILATATION AND CURETTAGE  2012    ESOPHAGOSCOPY N/A 04/18/2024    Procedure: ESOPHAGOSCOPY WITH BIOPSY;  Surgeon: Latricia Hernandez MD;  Location: [facility] ENDOSCOPY;  Service: Gastroenterology;  Laterality: N/A;    HYSTERECTOMY  2015    partial    LAPAROSCOPIC CHOLECYSTECTOMY  1991    REDUCTION MAMMAPLASTY Bilateral 2005    SPINE SURGERY  2022    SUBTOTAL HYSTERECTOMY  2015, just left ovaries    UPPER GASTROINTESTINAL ENDOSCOPY  04/18/2024    EOE    VAGINAL HYSTERECTOMY  2015    WISDOM TOOTH EXTRACTION  1970     Family History   Problem Relation Age of Onset    Breast cancer Mother         mid 70's    Atrial fibrillation Mother     Hypoparathyroidism Mother     Hypertension Mother     Cancer Mother     Heart disease Mother     Hyperlipidemia Mother     Thyroid disease Mother     Thyroid disease Father     Liver cancer Sister     Stroke Sister     Heart disease Sister     Sjogren's syndrome Sister     Colon cancer Sister         1/2 sister    Diabetes Sister     Cancer Sister     Clotting disorder Brother     Ovarian cancer Neg Hx      Social History     Socioeconomic History    Marital status:    Tobacco Use    Smoking status: Never     Passive exposure: Never    Smokeless tobacco: Never   Vaping Use    Vaping status: Never Used   Substance and Sexual Activity    Alcohol use: Never    Drug use: Never    Sexual activity: Not Currently     Partners: Male     Birth control/protection: Hysterectomy       Current  "Outpatient Medications:     dicyclomine (BENTYL) 10 MG capsule, Take 1 capsule by mouth 4 (Four) Times a Day., Disp: 120 capsule, Rfl: 1    EPINEPHrine (EPIPEN) 0.3 MG/0.3ML solution auto-injector injection, INJECT AS NEEDED FOR SYSTEMIC ALLERGIC REACTION, Disp: , Rfl:     famotidine (Pepcid) 20 MG tablet, Take 1 tablet by mouth 2 (Two) Times a Day for 180 days., Disp: 180 tablet, Rfl: 1    levothyroxine (SYNTHROID, LEVOTHROID) 100 MCG tablet, Take 1 tablet by mouth Daily. (Patient taking differently: Take 1 tablet by mouth Daily. Take 100 MCG daily, except for Sundays.), Disp: 90 tablet, Rfl: 3    multivitamin (MULTI-VITAMIN DAILY PO), 1 tablet Daily., Disp: , Rfl:     polyethylene glycol (MIRALAX) 17 g packet, Take 17 g by mouth Daily., Disp: 1 each, Rfl: 3    rizatriptan (MAXALT) 10 MG tablet, Take 1 tablet by mouth 1 (One) Time As Needed for Migraine., Disp: 27 tablet, Rfl: 3  Allergies   Allergen Reactions    Bee Venom Anaphylaxis    Nuts Anaphylaxis    Potato Anaphylaxis    Soybean-Containing Drug Products Anaphylaxis     Review of Systems    The following portions of the patient's history were reviewed and updated as appropriate: allergies, current medications, past family history, past medical history, past social history, past surgical history and problem list.    Objective     Physical Exam:  Vitals:    06/11/24 0859   BP: 102/80   Pulse: 82   SpO2: 98%   Weight: 73.9 kg (163 lb)   Height: 175.3 cm (69\")       Physical Exam  Constitutional:       General: She is not in acute distress.     Appearance: Normal appearance.   HENT:      Head: Normocephalic and atraumatic.   Eyes:      General: No scleral icterus.     Conjunctiva/sclera: Conjunctivae normal.   Cardiovascular:      Rate and Rhythm: Normal rate.   Pulmonary:      Effort: Pulmonary effort is normal. No respiratory distress.   Abdominal:      General: There is no distension.      Tenderness: There is no abdominal tenderness.   Skin:     Coloration: " Skin is not jaundiced or pale.   Neurological:      General: No focal deficit present.      Mental Status: She is alert and oriented to person, place, and time.   Psychiatric:         Mood and Affect: Mood normal.         Behavior: Behavior normal.         Results Review:   I reviewed the patient's new clinical results.    Office Visit on 2024   Component Date Value Ref Range Status    FSH 2024 91.9  mIU/mL Final    Comment:                      Adult Female             Range                        Follicular phase      3.5 -  12.5                        Ovulation phase       4.7 -  21.5                        Luteal phase          1.7 -   7.7                        Postmenopausal       25.8 - 134.8      Estradiol 2024 <5.0  pg/mL Final    Comment:                      Adult Female             Range                        Follicular phase     12.5 - 166.0                        Ovulation phase      85.8 - 498.0                        Luteal phase         43.8 - 211.0                        Postmenopausal       <6.0 -  54.7                       Pregnancy                        1st trimester     215.0 - >4300.0  Roche ECLIA methodology     Admission on 2024, Discharged on 2024   Component Date Value Ref Range Status    Reference Lab Report 2024    Final                    Value:[facility]  [address] [place] [address]  Phone: [phone] or [phone]  Fax: [phone]  Calvin Desouza M.D., Medical Director    PATIENT NAME                                     LABORATORY NO.  FERNANDO DAILY                                  [id number]  [id number]                                 AGE                    SEX   N              CLIENT REF #  [facility]                    59        1965      F     [id number]      [id number]    [address] [place] [address] BY-PASS                        REQUESTING CHEL GAR M.D.          COPY TO.  [address]                                [place] [address]                         DATE COLLECTED            DATE RECEIVED          DATE REPORTED  04/18/2024 04/18/2024 04/19/2024    DIAGNOSIS:  A.     DUODENUM, BIOPSY:  Duodenal type mucosa with no significant histopathologic abnormalities  Negative                           for Celiac disease, metaplasia, microorganisms or atypia  B.     STOMACH, BIOPSY:  Gastric mucosa with mild chronic gastritis  Negative for H. pylori, metaplasia , dysplasia or malignancy  C.     ESOPHAGUS, BIOPSY, DISTAL:  Reflux esophagitis (21 eosinophils/hpf)  Negative for intestinal metaplasia, dysplasia or malignancy  D.     ESOPHAGUS, BIOPSY, MID:  Reflux esophagitis (8 eosinophils/hpf)  Negative for intestinal metaplasia, dysplasia or malignancy  E.     TERMINAL ILEUM BIOPSY:  Intestinal mucosa without pathologic alterations  F.     RANDOM COLON BIOPSIES, ASCENDING, TRANSVERSE, AND DESCENDING:  Colonic type mucosa with no significant histopathologic abnormalities  Negative for architectural distortion, significant inflammation or atypia    RLL    CLINICAL HISTORY:  Esophageal dysphagia, Change in bowel habits      SPECIMENS RECEIVED:  A.    DUODENUM, BIOPSY  B.    STOMACH, BIOPSY  C.    ESOPHAGUS, BIOPSY , DISTAL  D.    ESOPHAGUS, BIOPSY , MID  E.    TERMINAL ILEUM BIOPSY  F.                              RANDOM COLON BIOPSIES , ASCENDING, TRANSVERSE, AND DESCENDING    MICROSCOPIC DESCRIPTION:  Tissue blocks are prepared and slides are examined microscopically on all  specimens. See diagnosis for details.    Professional interpretation rendered by Calvin Desouza M.D., F.C.A.P. at  [employer] [employer], [address] [place] [address].    GROSS DESCRIPTION:  A.    Labeled duodenal biopsy are 2 portions of tan soft tissue measuring 0.4 x  0.2 x 0.2 cm in aggregate.  Submitted entirely 1 block.  BARTOLO LIMA     Labeled stomach biopsy is a 0.5 x 0.2 x 0.2 cm portion of tan soft tissue  which is submitted entirely 1 block.  C.    Labeled distal esophageal biopsy are 2 portions of tan soft tissue measuring  0.4 x 0.3 x 0.1 cm in aggregate.  Submitted entirely 1 block.  D.    Labeled midesophagus biopsy are 2 portions of gray soft tissue measuring  0.4 x 0.4 x 0.1 cm in aggregate.  Submitted entirely 1 block.  E.    Labeled terminal ileum biopsy are 2 portions of tan soft tissue measuring  0.4                           x 0.2 x 0.1 cm in aggregate.  Submitted entirely 1 block.  F.    Labeled random colon biopsies for ascending, transverse are multiple  portions of tan soft tissue measuring 0.8 x 0.8 x 0.1 cm in aggregate.  Submitted entirely 1 block.    REVIEWED, DIAGNOSED AND ELECTRONICALLY  SIGNED BY:    Calvin Desouza M.D., F.C.A.P.  CPT CODES:  88305x6        Mammo Screening Digital Tomosynthesis Bilateral With CAD    Result Date: 5/20/2024  No suspicious abnormality identified.  OVERALL ASSESSMENT: ACR BI-RADS CATEGORY: 1, NEGATIVE:  Recommend continued routine annual screening mammogram.  The standard false-negative rate of mammography is between 10% and 25%. Complex patterns or increased breast density will markedly elevate the false-negative rate of mammography.   A letter, in lay terminology, with the results of this exam will be mailed to the patient.   This report was finalized on 5/20/2024 12:14 PM by Tegan Ken MD.       Assessment / Plan      Diagnoses and all orders for this visit:    1. Abdominal pain, RLQ (Primary)    2. Esophagitis, eosinophilic    Other orders  -     famotidine (Pepcid) 20 MG tablet; Take 1 tablet by mouth 2 (Two) Times a Day for 180 days.  Dispense: 180 tablet; Refill: 1         Trial of probiotics.  Suggest Florajen.  MiraLAX daily for now.  1 capful.  Will refill famotidine as above.  Dupixent was denied by her insurance.  She may be switching insurance soon.  Once her insurance  has been switched, we will try Dupixent again.  If that gets denied, we will try budesonide.    Follow Up:   Return in about 6 months (around 12/11/2024).    Latricia Hernandez MD  [facility] [place]  6/11/2024  11:02 EDT     Part of this note may be an electronic transcription/translation of spoken language to printed text using the Dragon Dictation System.

## 2024-06-16 LAB
25(OH)D3+25(OH)D2 SERPL-MCNC: 56.3 NG/ML (ref 30–100)
ALBUMIN SERPL-MCNC: 4.8 G/DL (ref 3.8–4.9)
ALBUMIN/GLOB SERPL: 2.4 {RATIO}
ALP SERPL-CCNC: 76 IU/L (ref 44–121)
ALT SERPL-CCNC: 15 IU/L (ref 0–32)
AST SERPL-CCNC: 15 IU/L (ref 0–40)
BASOPHILS # BLD AUTO: 0.1 X10E3/UL (ref 0–0.2)
BASOPHILS NFR BLD AUTO: 2 %
BILIRUB SERPL-MCNC: 0.5 MG/DL (ref 0–1.2)
BUN SERPL-MCNC: 9 MG/DL (ref 6–24)
BUN/CREAT SERPL: 10 (ref 9–23)
CALCIUM SERPL-MCNC: 10.2 MG/DL (ref 8.7–10.2)
CHLORIDE SERPL-SCNC: 101 MMOL/L (ref 96–106)
CHOLEST SERPL-MCNC: 245 MG/DL (ref 100–199)
CO2 SERPL-SCNC: 25 MMOL/L (ref 20–29)
CREAT SERPL-MCNC: 0.86 MG/DL (ref 0.57–1)
EGFRCR SERPLBLD CKD-EPI 2021: 78 ML/MIN/1.73
EOSINOPHIL # BLD AUTO: 0 X10E3/UL (ref 0–0.4)
EOSINOPHIL NFR BLD AUTO: 1 %
ERYTHROCYTE [DISTWIDTH] IN BLOOD BY AUTOMATED COUNT: 12.8 % (ref 11.7–15.4)
GLOBULIN SER CALC-MCNC: 2 G/DL (ref 1.5–4.5)
GLUCOSE SERPL-MCNC: 94 MG/DL (ref 70–99)
HBA1C MFR BLD: 5.5 % (ref 4.8–5.6)
HCT VFR BLD AUTO: 45.2 % (ref 34–46.6)
HDLC SERPL-MCNC: 81 MG/DL
HGB BLD-MCNC: 14.5 G/DL (ref 11.1–15.9)
IMM GRANULOCYTES # BLD AUTO: 0 X10E3/UL (ref 0–0.1)
IMM GRANULOCYTES NFR BLD AUTO: 0 %
LDLC SERPL CALC-MCNC: 147 MG/DL (ref 0–99)
LYMPHOCYTES # BLD AUTO: 1.3 X10E3/UL (ref 0.7–3.1)
LYMPHOCYTES NFR BLD AUTO: 31 %
MCH RBC QN AUTO: 28.4 PG (ref 26.6–33)
MCHC RBC AUTO-ENTMCNC: 32.1 G/DL (ref 31.5–35.7)
MCV RBC AUTO: 89 FL (ref 79–97)
MONOCYTES # BLD AUTO: 0.3 X10E3/UL (ref 0.1–0.9)
MONOCYTES NFR BLD AUTO: 6 %
NEUTROPHILS # BLD AUTO: 2.5 X10E3/UL (ref 1.4–7)
NEUTROPHILS NFR BLD AUTO: 60 %
PLATELET # BLD AUTO: 244 X10E3/UL (ref 150–450)
POTASSIUM SERPL-SCNC: 4.3 MMOL/L (ref 3.5–5.2)
PROT SERPL-MCNC: 6.8 G/DL (ref 6–8.5)
PYRIDOXAL PHOS SERPL-MCNC: 32.3 UG/L (ref 3.4–65.2)
RBC # BLD AUTO: 5.1 X10E6/UL (ref 3.77–5.28)
SODIUM SERPL-SCNC: 140 MMOL/L (ref 134–144)
T4 FREE SERPL-MCNC: 1.43 NG/DL (ref 0.82–1.77)
TRIGL SERPL-MCNC: 97 MG/DL (ref 0–149)
TSH SERPL DL<=0.005 MIU/L-ACNC: 0.44 UIU/ML (ref 0.45–4.5)
VIT B12 SERPL-MCNC: 747 PG/ML (ref 232–1245)
VLDLC SERPL CALC-MCNC: 17 MG/DL (ref 5–40)
WBC # BLD AUTO: 4.1 X10E3/UL (ref 3.4–10.8)

## 2024-06-28 ENCOUNTER — OFFICE VISIT (OUTPATIENT)
Dept: INTERNAL MEDICINE | Facility: CLINIC | Age: 59
End: 2024-06-28
Payer: COMMERCIAL

## 2024-06-28 VITALS
HEART RATE: 63 BPM | HEIGHT: 69 IN | TEMPERATURE: 96.8 F | RESPIRATION RATE: 16 BRPM | DIASTOLIC BLOOD PRESSURE: 86 MMHG | SYSTOLIC BLOOD PRESSURE: 116 MMHG | WEIGHT: 163 LBS | OXYGEN SATURATION: 97 % | BODY MASS INDEX: 24.14 KG/M2

## 2024-06-28 DIAGNOSIS — Z00.00 ROUTINE GENERAL MEDICAL EXAMINATION AT A HEALTH CARE FACILITY: Primary | ICD-10-CM

## 2024-06-28 DIAGNOSIS — R10.31 RIGHT LOWER QUADRANT ABDOMINAL PAIN: ICD-10-CM

## 2024-06-28 DIAGNOSIS — G43.809 OTHER MIGRAINE WITHOUT STATUS MIGRAINOSUS, NOT INTRACTABLE: ICD-10-CM

## 2024-06-28 PROCEDURE — 99396 PREV VISIT EST AGE 40-64: CPT | Performed by: INTERNAL MEDICINE

## 2024-09-09 ENCOUNTER — TELEPHONE (OUTPATIENT)
Dept: INTERNAL MEDICINE | Facility: CLINIC | Age: 59
End: 2024-09-09
Payer: COMMERCIAL

## 2024-09-09 NOTE — TELEPHONE ENCOUNTER
Caller: Agata Talbert    Relationship to patient: Self    Best call back number: [phone]     Chief complaint: ABNORMAL CT RESULTS THAT SHOW A LUNG NODULE    Type of visit: REVIEW RESULTS    Requested date: WAS OFFERED 09/11/24 AT 9:30.  [facility] [place] [facility] THE [facility] AND IS ASKING IF SHE CAN COME LATER THAT DAY. PATIENT CURRENTLY SCHEDULE AT 11:15 AM. ASKING IF ANYTHING IS AVAILABLE LATER TO HELP ENSURE THEY WON'T BE LATE    If rescheduling, when is the original appointment: 09/11/24 @ 11:15    Additional notes: PLEASE CALL TO ADVISE IF ABLE TO WORK IN LATER TIME. OR THEY CAN COME TOMORROW ANY TIME 09/10/24

## 2024-09-11 ENCOUNTER — OFFICE VISIT (OUTPATIENT)
Dept: INTERNAL MEDICINE | Facility: CLINIC | Age: 59
End: 2024-09-11
Payer: COMMERCIAL

## 2024-09-11 VITALS
WEIGHT: 163 LBS | RESPIRATION RATE: 16 BRPM | DIASTOLIC BLOOD PRESSURE: 76 MMHG | TEMPERATURE: 97.3 F | SYSTOLIC BLOOD PRESSURE: 100 MMHG | OXYGEN SATURATION: 96 % | HEART RATE: 93 BPM | HEIGHT: 69 IN | BODY MASS INDEX: 24.14 KG/M2

## 2024-09-11 DIAGNOSIS — R91.1 LUNG NODULE: Primary | ICD-10-CM

## 2024-09-11 PROCEDURE — 99214 OFFICE O/P EST MOD 30 MIN: CPT | Performed by: INTERNAL MEDICINE

## 2024-09-11 NOTE — PROGRESS NOTES
"Subjective     Patient ID: Agata Talbert is a 59 y.o. female. Patient is here for management of multiple medical problems.     Chief Complaint   Patient presents with    Lung Nodule     History of Present Illness   Ct calcium scan. Zero. Lung nodule found.        The following portions of the patient's history were reviewed and updated as appropriate: allergies, current medications, past family history, past medical history, past social history, past surgical history and problem list.    Review of Systems    Current Outpatient Medications:     dicyclomine (BENTYL) 10 MG capsule, Take 1 capsule by mouth 4 (Four) Times a Day., Disp: 120 capsule, Rfl: 1    EPINEPHrine (EPIPEN) 0.3 MG/0.3ML solution auto-injector injection, INJECT AS NEEDED FOR SYSTEMIC ALLERGIC REACTION, Disp: , Rfl:     famotidine (Pepcid) 20 MG tablet, Take 1 tablet by mouth 2 (Two) Times a Day for 180 days., Disp: 180 tablet, Rfl: 1    FIBER SELECT GUMMIES PO, Take  by mouth., Disp: , Rfl:     levothyroxine (SYNTHROID, LEVOTHROID) 100 MCG tablet, Take 1 tablet by mouth Daily. (Patient taking differently: Take 1 tablet by mouth Daily. Take 100 MCG daily, except for Sundays.), Disp: 90 tablet, Rfl: 3    multivitamin (MULTI-VITAMIN DAILY PO), 1 tablet Daily., Disp: , Rfl:     polyethylene glycol (MIRALAX) 17 g packet, Take 17 g by mouth Daily., Disp: 1 each, Rfl: 3    rizatriptan (MAXALT) 10 MG tablet, Take 1 tablet by mouth 1 (One) Time As Needed for Migraine., Disp: 27 tablet, Rfl: 3    Objective      Blood pressure 100/76, pulse 93, temperature 97.3 °F (36.3 °C), resp. rate 16, height 175.3 cm (69\"), weight 73.9 kg (163 lb), SpO2 96%.    BMI is within normal parameters. No other follow-up for BMI required.       Physical Exam     General Appearance:    Alert, cooperative, no distress, appears stated age   Head:    Normocephalic, without obvious abnormality, atraumatic   Eyes:    PERRL, conjunctiva/corneas clear, EOM's intact   Ears:    Normal TM's " and external ear canals, both ears   Nose:   Nares normal, septum midline, mucosa normal, no drainage   or sinus tenderness   Throat:   Lips, mucosa, and tongue normal; teeth and gums normal   Neck:   Supple, symmetrical, trachea midline, no adenopathy;        thyroid:  No enlargement/tenderness/nodules; no carotid    bruit or JVD   Back:     Symmetric, no curvature, ROM normal, no CVA tenderness   Lungs:     Clear to auscultation bilaterally, respirations unlabored   Chest wall:    No tenderness or deformity   Heart:    Regular rate and rhythm, S1 and S2 normal, no murmur,        rub or gallop   Abdomen:     Soft, non-tender, bowel sounds active all four quadrants,     no masses, no organomegaly   Extremities:   Extremities normal, atraumatic, no cyanosis or edema   Pulses:   2+ and symmetric all extremities   Skin:   Skin color, texture, turgor normal, no rashes or lesions   Lymph nodes:   Cervical, supraclavicular, and axillary nodes normal   Neurologic:   CNII-XII intact. Normal strength, sensation and reflexes       throughout      Results for orders placed or performed in visit on 05/17/24   Comprehensive Metabolic Panel    Specimen: Blood   Result Value Ref Range    Glucose 94 70 - 99 mg/dL    BUN 9 6 - 24 mg/dL    Creatinine 0.86 0.57 - 1.00 mg/dL    EGFR Result 78 >59 mL/min/1.73    BUN/Creatinine Ratio 10 9 - 23    Sodium 140 134 - 144 mmol/L    Potassium 4.3 3.5 - 5.2 mmol/L    Chloride 101 96 - 106 mmol/L    Total CO2 25 20 - 29 mmol/L    Calcium 10.2 8.7 - 10.2 mg/dL    Total Protein 6.8 6.0 - 8.5 g/dL    Albumin 4.8 3.8 - 4.9 g/dL    Globulin 2.0 1.5 - 4.5 g/dL    A/G Ratio 2.4     Total Bilirubin 0.5 0.0 - 1.2 mg/dL    Alkaline Phosphatase 76 44 - 121 IU/L    AST (SGOT) 15 0 - 40 IU/L    ALT (SGPT) 15 0 - 32 IU/L   TSH    Specimen: Blood   Result Value Ref Range    TSH 0.444 (L) 0.450 - 4.500 uIU/mL   T4, Free    Specimen: Blood   Result Value Ref Range    Free T4 1.43 0.82 - 1.77 ng/dL   Vitamin B12     Specimen: Blood   Result Value Ref Range    Vitamin B-12 747 232 - 1,245 pg/mL   Lipid Panel    Specimen: Blood   Result Value Ref Range    Total Cholesterol 245 (H) 100 - 199 mg/dL    Triglycerides 97 0 - 149 mg/dL    HDL Cholesterol 81 >39 mg/dL    VLDL Cholesterol Varun 17 5 - 40 mg/dL    LDL Chol Calc (NIH) 147 (H) 0 - 99 mg/dL   Vitamin D,25-Hydroxy    Specimen: Blood   Result Value Ref Range    25 Hydroxy, Vitamin D 56.3 30.0 - 100.0 ng/mL   Hemoglobin A1c    Specimen: Blood   Result Value Ref Range    Hemoglobin A1C 5.5 4.8 - 5.6 %   Vitamin B6    Specimen: Blood   Result Value Ref Range    Vitamin B6 32.3 3.4 - 65.2 ug/L   CBC & Differential    Specimen: Blood   Result Value Ref Range    WBC 4.1 3.4 - 10.8 x10E3/uL    RBC 5.10 3.77 - 5.28 x10E6/uL    Hemoglobin 14.5 11.1 - 15.9 g/dL    Hematocrit 45.2 34.0 - 46.6 %    MCV 89 79 - 97 fL    MCH 28.4 26.6 - 33.0 pg    MCHC 32.1 31.5 - 35.7 g/dL    RDW 12.8 11.7 - 15.4 %    Platelets 244 150 - 450 x10E3/uL    Neutrophil Rel % 60 Not Estab. %    Lymphocyte Rel % 31 Not Estab. %    Monocyte Rel % 6 Not Estab. %    Eosinophil Rel % 1 Not Estab. %    Basophil Rel % 2 Not Estab. %    Neutrophils Absolute 2.5 1.4 - 7.0 x10E3/uL    Lymphocytes Absolute 1.3 0.7 - 3.1 x10E3/uL    Monocytes Absolute 0.3 0.1 - 0.9 x10E3/uL    Eosinophils Absolute 0.0 0.0 - 0.4 x10E3/uL    Basophils Absolute 0.1 0.0 - 0.2 x10E3/uL    Immature Granulocyte Rel % 0 Not Estab. %    Immature Grans Absolute 0.0 0.0 - 0.1 x10E3/uL         Assessment & Plan     Ct CCS  with 4mm non varun nodule left. Multiple little  nodules.    None smoker.    Ct con 2-3 weeks ago.     Probable reactive tissue. Will get repeat ct in 4-6 weeks from now.       Diagnoses and all orders for this visit:    1. Lung nodule (Primary)  -     CT Chest Without Contrast Diagnostic; Future      Return in about 6 weeks (around 10/23/2024).          There are no Patient Instructions on file for this visit.     Diogo Monge,  MD    Assessment & Plan       Answers submitted by the patient for this visit:  Other (Submitted on 9/10/2024)  Please describe your symptoms.: abnormal chest CT  Have you had these symptoms before?: No  How long have you been having these symptoms?: 1-4 days  Primary Reason for Visit (Submitted on 9/10/2024)  What is the primary reason for your visit?: Problem Not Listed

## 2024-10-02 ENCOUNTER — HOSPITAL ENCOUNTER (OUTPATIENT)
Facility: HOSPITAL | Age: 59
Discharge: HOME | End: 2024-10-02
Admitting: INTERNAL MEDICINE
Payer: COMMERCIAL

## 2024-10-02 DIAGNOSIS — E03.9 ACQUIRED HYPOTHYROIDISM: ICD-10-CM

## 2024-10-02 DIAGNOSIS — R91.1 LUNG NODULE: ICD-10-CM

## 2024-10-02 PROCEDURE — 71250 CT THORAX DX C-: CPT

## 2024-10-02 RX ORDER — LEVOTHYROXINE SODIUM 100 UG/1
100 TABLET ORAL DAILY
Qty: 90 TABLET | Refills: 0 | Status: SHIPPED | OUTPATIENT
Start: 2024-10-02

## 2024-10-03 NOTE — PROGRESS NOTES
Technique: Axial CT images were obtained of the chest without contrast administration.  Reconstructed coronal and sagittal images were also obtained. Automated exposure control and iterative construction methods were used.    Findings:  Noncontrast soft tissues of the lower neck are without acute abnormality. Heart size normal. No significant coronary artery calcification. Negative for pericardial effusion or pleural effusion. No mediastinal adenopathy or hilar adenopathy. No axillary   adenopathy.    Trachea and mainstem bronchi are patent. Lungs are without consolidation or findings of pneumonia. No pneumothorax. No suspicious pulmonary nodule. Within the left lower lobe abutting the pulmonary fissure there is a benign intrapulmonary lymph node   measuring 4 mm which requires no follow-up (3/332).    Upper abdomen demonstrates normal noncontrast visualized portions of the liver, spleen, adrenal glands, and pancreas. Cholecystectomy. No free fluid in the upper abdomen. Mild convex right dextrocurvature of the thoracic spine. No aggressive osseous   lesion.    IMPRESSION:  Impression:  1. No acute intrathoracic process.  2. No suspicious pulmonary nodule.        Electronically Signed: Peng Jacinto MD    10/3/2024 11:21 AM EDT    Workstation ID: [id number]

## 2024-10-16 ENCOUNTER — OFFICE VISIT (OUTPATIENT)
Dept: GASTROENTEROLOGY | Facility: CLINIC | Age: 59
End: 2024-10-16
Payer: COMMERCIAL

## 2024-10-16 VITALS
DIASTOLIC BLOOD PRESSURE: 80 MMHG | SYSTOLIC BLOOD PRESSURE: 118 MMHG | HEART RATE: 106 BPM | OXYGEN SATURATION: 99 % | BODY MASS INDEX: 24.22 KG/M2 | WEIGHT: 164 LBS

## 2024-10-16 DIAGNOSIS — K20.0 ESOPHAGITIS, EOSINOPHILIC: Primary | ICD-10-CM

## 2024-10-16 RX ORDER — BUDESONIDE 0.5 MG/2ML
INHALANT ORAL
Qty: 100 ML | Refills: 0 | Status: SHIPPED | OUTPATIENT
Start: 2024-10-16

## 2024-10-16 RX ORDER — PREDNISONE 10 MG/1
TABLET ORAL
Qty: 21 TABLET | Refills: 0 | Status: SHIPPED | OUTPATIENT
Start: 2024-10-16 | End: 2024-10-30

## 2024-10-16 NOTE — PROGRESS NOTES
Follow Up Note     Date: 10/16/2024   Patient Name: Agata Talbert  MRN: [id number]  : 1965     Referring Physician: Diogo Monge MD    Chief Complaint:    Chief Complaint   Patient presents with    Follow-up    Difficulty Swallowing       Interval History:   10/18/2024  Agata Talbert is a 59 y.o. female who is here today for follow up.    Not having severe dysphagia at this time.    Subjective      Past Medical History:   Diagnosis Date    Allergic all my life    Arthritis     Cholelithiasis removed     Eosinophilic esophagitis     GERD (gastroesophageal reflux disease)     Hashimoto's disease     Hypothyroidism     Irritable bowel syndrome ?    Low back pain     Migraines     PONV (postoperative nausea and vomiting)     Vaginal atrophy     Varicella      Past Surgical History:   Procedure Laterality Date    ABDOMINAL SURGERY      BACK SURGERY      Fusion L5-S1    BREAST BIOPSY Left 2012    CHOLECYSTECTOMY      COLONOSCOPY N/A 2024    Procedure: COLONOSCOPY WITH BIOPSY;  Surgeon: Latricia Hernandez MD;  Location: [facility] ENDOSCOPY;  Service: Gastroenterology;  Laterality: N/A;    DILATATION AND CURETTAGE      ESOPHAGOSCOPY N/A 2024    Procedure: ESOPHAGOSCOPY WITH BIOPSY;  Surgeon: Latricia Hernandez MD;  Location: [facility] ENDOSCOPY;  Service: Gastroenterology;  Laterality: N/A;    HYSTERECTOMY      partial    LAPAROSCOPIC CHOLECYSTECTOMY  1991    REDUCTION MAMMAPLASTY Bilateral 2005    SPINE SURGERY      SUBTOTAL HYSTERECTOMY  2015, just left ovaries    UPPER GASTROINTESTINAL ENDOSCOPY  2024    EOE    VAGINAL HYSTERECTOMY      WISDOM TOOTH EXTRACTION  1970     Family History   Problem Relation Age of Onset    Breast cancer Mother         mid 70's    Atrial fibrillation Mother     Hypoparathyroidism Mother     Hypertension Mother     Cancer Mother     Heart disease Mother     Hyperlipidemia Mother     Thyroid disease Mother     Thyroid disease Father      Liver cancer Sister     Stroke Sister     Heart disease Sister     Sjogren's syndrome Sister     Colon cancer Sister         1/2 sister    Diabetes Sister     Cancer Sister     Clotting disorder Brother     Ovarian cancer Neg Hx      Social History     Socioeconomic History    Marital status:    Tobacco Use    Smoking status: Never     Passive exposure: Never    Smokeless tobacco: Never   Vaping Use    Vaping status: Never Used   Substance and Sexual Activity    Alcohol use: Never    Drug use: Never    Sexual activity: Not Currently     Partners: Male     Birth control/protection: Hysterectomy       Current Outpatient Medications:     dicyclomine (BENTYL) 10 MG capsule, Take 1 capsule by mouth 4 (Four) Times a Day., Disp: 120 capsule, Rfl: 1    EPINEPHrine (EPIPEN) 0.3 MG/0.3ML solution auto-injector injection, INJECT AS NEEDED FOR SYSTEMIC ALLERGIC REACTION, Disp: , Rfl:     famotidine (Pepcid) 20 MG tablet, Take 1 tablet by mouth 2 (Two) Times a Day for 180 days., Disp: 180 tablet, Rfl: 1    FIBER SELECT GUMMIES PO, Take  by mouth., Disp: , Rfl:     levothyroxine (SYNTHROID, LEVOTHROID) 100 MCG tablet, Take 1 tablet by mouth once daily, Disp: 90 tablet, Rfl: 0    multivitamin (MULTI-VITAMIN DAILY PO), 1 tablet Daily., Disp: , Rfl:     polyethylene glycol (MIRALAX) 17 g packet, Take 17 g by mouth Daily., Disp: 1 each, Rfl: 3    rizatriptan (MAXALT) 10 MG tablet, Take 1 tablet by mouth 1 (One) Time As Needed for Migraine., Disp: 27 tablet, Rfl: 3    budesonide (Pulmicort) 0.5 MG/2ML nebulizer solution, Dose: 2 mg BID PO Mix with sucralose (Splenda; 10 1-gram packets per 1 mg of budesonide, creating a volume of approximately 8 mL) or another carrier vehicle that is not liquid, Disp: 100 mL, Rfl: 0    predniSONE (DELTASONE) 10 MG tablet, Take 2 tablets by mouth Daily for 7 days, THEN 1 tablet Daily for 7 days., Disp: 21 tablet, Rfl: 0  Allergies   Allergen Reactions    Bee Venom Anaphylaxis    Nuts  Anaphylaxis    Potato Anaphylaxis    Soybean-Containing Drug Products Anaphylaxis     Review of Systems  Dysphagia    The following portions of the patient's history were reviewed and updated as appropriate: allergies, current medications, past family history, past medical history, past social history, past surgical history and problem list.    Objective     Physical Exam:  Vitals:    10/16/24 1534   BP: 118/80   Pulse: 106   SpO2: 99%   Weight: 74.4 kg (164 lb)       Physical Exam  Constitutional:       General: She is not in acute distress.     Appearance: Normal appearance.   HENT:      Head: Normocephalic and atraumatic.   Eyes:      General: No scleral icterus.     Conjunctiva/sclera: Conjunctivae normal.   Cardiovascular:      Rate and Rhythm: Normal rate.   Pulmonary:      Effort: Pulmonary effort is normal. No respiratory distress.   Neurological:      General: No focal deficit present.      Mental Status: She is alert and oriented to person, place, and time.   Psychiatric:         Mood and Affect: Mood normal.         Behavior: Behavior normal.         Results Review:   I reviewed the patient's new clinical results.    No visits with results within 90 Day(s) from this visit.   Latest known visit with results is:   Office Visit on 05/17/2024   Component Date Value Ref Range Status    Glucose 06/12/2024 94  70 - 99 mg/dL Final    BUN 06/12/2024 9  6 - 24 mg/dL Final    Creatinine 06/12/2024 0.86  0.57 - 1.00 mg/dL Final    EGFR Result 06/12/2024 78  >59 mL/min/1.73 Final    BUN/Creatinine Ratio 06/12/2024 10  9 - 23 Final    Sodium 06/12/2024 140  134 - 144 mmol/L Final    Potassium 06/12/2024 4.3  3.5 - 5.2 mmol/L Final    Chloride 06/12/2024 101  96 - 106 mmol/L Final    Total CO2 06/12/2024 25  20 - 29 mmol/L Final    Calcium 06/12/2024 10.2  8.7 - 10.2 mg/dL Final    Total Protein 06/12/2024 6.8  6.0 - 8.5 g/dL Final    Albumin 06/12/2024 4.8  3.8 - 4.9 g/dL Final    Globulin 06/12/2024 2.0  1.5 - 4.5  g/dL Final    A/G Ratio 06/12/2024 2.4   Final    Total Bilirubin 06/12/2024 0.5  0.0 - 1.2 mg/dL Final    Alkaline Phosphatase 06/12/2024 76  44 - 121 IU/L Final    AST (SGOT) 06/12/2024 15  0 - 40 IU/L Final    ALT (SGPT) 06/12/2024 15  0 - 32 IU/L Final    TSH 06/12/2024 0.444 (L)  0.450 - 4.500 uIU/mL Final    Free T4 06/12/2024 1.43  0.82 - 1.77 ng/dL Final    Vitamin B-12 06/12/2024 747  232 - 1,245 pg/mL Final    WBC 06/12/2024 4.1  3.4 - 10.8 x10E3/uL Final    RBC 06/12/2024 5.10  3.77 - 5.28 x10E6/uL Final    Hemoglobin 06/12/2024 14.5  11.1 - 15.9 g/dL Final    Hematocrit 06/12/2024 45.2  34.0 - 46.6 % Final    MCV 06/12/2024 89  79 - 97 fL Final    MCH 06/12/2024 28.4  26.6 - 33.0 pg Final    MCHC 06/12/2024 32.1  31.5 - 35.7 g/dL Final    RDW 06/12/2024 12.8  11.7 - 15.4 % Final    Platelets 06/12/2024 244  150 - 450 x10E3/uL Final    Neutrophil Rel % 06/12/2024 60  Not Estab. % Final    Lymphocyte Rel % 06/12/2024 31  Not Estab. % Final    Monocyte Rel % 06/12/2024 6  Not Estab. % Final    Eosinophil Rel % 06/12/2024 1  Not Estab. % Final    Basophil Rel % 06/12/2024 2  Not Estab. % Final    Neutrophils Absolute 06/12/2024 2.5  1.4 - 7.0 x10E3/uL Final    Lymphocytes Absolute 06/12/2024 1.3  0.7 - 3.1 x10E3/uL Final    Monocytes Absolute 06/12/2024 0.3  0.1 - 0.9 x10E3/uL Final    Eosinophils Absolute 06/12/2024 0.0  0.0 - 0.4 x10E3/uL Final    Basophils Absolute 06/12/2024 0.1  0.0 - 0.2 x10E3/uL Final    Immature Granulocyte Rel % 06/12/2024 0  Not Estab. % Final    Immature Grans Absolute 06/12/2024 0.0  0.0 - 0.1 x10E3/uL Final    Total Cholesterol 06/12/2024 245 (H)  100 - 199 mg/dL Final    Triglycerides 06/12/2024 97  0 - 149 mg/dL Final    HDL Cholesterol 06/12/2024 81  >39 mg/dL Final    VLDL Cholesterol Varun 06/12/2024 17  5 - 40 mg/dL Final    LDL Chol Calc ([facility]) 06/12/2024 147 (H)  0 - 99 mg/dL Final    25 Hydroxy, Vitamin D 06/12/2024 56.3  30.0 - 100.0 ng/mL Final    Comment: Vitamin D  deficiency has been defined by the [place] [employer]  [employer] and an Endocrine Society practice guideline as a  level of serum 25-OH vitamin D less than 20 ng/mL (1,2).  The Endocrine Society went on to further define vitamin D  insufficiency as a level between 21 and 29 ng/mL (2).  1. IOM ([place] [employer]). 2010. Dietary reference     intakes for calcium and D. [place]: The     National Academ[employer].  2. Cecy MF, Isabella VIRAMONTES, Walter HURLEY, et al.     Evaluation, treatment, and prevention of vitamin D     deficiency: an Endocrine Society clinical practice     guideline. JCEM. 2011 Jul; 96(7):1911-30.      Hemoglobin A1C 06/12/2024 5.5  4.8 - 5.6 % Final    Comment:          Prediabetes: 5.7 - 6.4           Diabetes: >6.4           Glycemic control for adults with diabetes: <7.0      Vitamin B6 06/12/2024 32.3  3.4 - 65.2 ug/L Final    Comment:                              Deficiency:         <3.4                               Marginal:      3.4 - 5.1                               Adequate:           >5.1        CT Chest Without Contrast Diagnostic    Result Date: 10/3/2024  Impression: 1. No acute intrathoracic process. 2. No suspicious pulmonary nodule. Electronically Signed: Peng Jacinto MD  10/3/2024 11:21 AM EDT  Workstation ID: [id number]     Assessment / Plan      Diagnoses and all orders for this visit:    1. Esophagitis, eosinophilic (Primary)  -     budesonide (Pulmicort) 0.5 MG/2ML nebulizer solution; Dose: 2 mg BID PO  Mix with sucralose (Splenda; 10 1-gram packets per 1 mg of budesonide, creating a volume of approximately 8 mL) or another carrier vehicle that is not liquid  Dispense: 100 mL; Refill: 0    Other orders  -     predniSONE (DELTASONE) 10 MG tablet; Take 2 tablets by mouth Daily for 7 days, THEN 1 tablet Daily for 7 days.  Dispense: 21 tablet; Refill: 0           She had a denial on the Dupixent.    Will try budesonide as above.    Will also try p.o. steroids for a short  period to see if that improves her symptoms.      Follow Up:   Return if symptoms worsen or fail to improve.    Latricia Hernandez MD  [facility] [place]  10/18/2024  13:03 EDT     Part of this note may be an electronic transcription/translation of spoken language to printed text using the Dragon Dictation System.

## 2024-10-23 ENCOUNTER — OFFICE VISIT (OUTPATIENT)
Dept: INTERNAL MEDICINE | Facility: CLINIC | Age: 59
End: 2024-10-23
Payer: COMMERCIAL

## 2024-10-23 VITALS
DIASTOLIC BLOOD PRESSURE: 78 MMHG | HEIGHT: 69 IN | RESPIRATION RATE: 16 BRPM | WEIGHT: 159 LBS | OXYGEN SATURATION: 96 % | BODY MASS INDEX: 23.55 KG/M2 | HEART RATE: 94 BPM | TEMPERATURE: 97 F | SYSTOLIC BLOOD PRESSURE: 104 MMHG

## 2024-10-23 DIAGNOSIS — K20.0 ESOPHAGITIS, EOSINOPHILIC: ICD-10-CM

## 2024-10-23 DIAGNOSIS — G43.809 OTHER MIGRAINE WITHOUT STATUS MIGRAINOSUS, NOT INTRACTABLE: ICD-10-CM

## 2024-10-23 DIAGNOSIS — E03.9 ACQUIRED HYPOTHYROIDISM: ICD-10-CM

## 2024-10-23 PROCEDURE — 99213 OFFICE O/P EST LOW 20 MIN: CPT | Performed by: INTERNAL MEDICINE

## 2024-10-23 RX ORDER — BUDESONIDE 0.5 MG/2ML
INHALANT ORAL
Qty: 100 ML | Refills: 0 | Status: SHIPPED | OUTPATIENT
Start: 2024-10-23

## 2024-10-23 NOTE — PROGRESS NOTES
"Subjective     Patient ID: Agata Talbert is a 59 y.o. female. Patient is here for management of multiple medical problems.     Chief Complaint   Patient presents with    Lung Nodule     History of Present Illness   Lung nodule.      Had ct with lung ashely              The following portions of the patient's history were reviewed and updated as appropriate: allergies, current medications, past family history, past medical history, past social history, past surgical history and problem list.    Review of Systems    Current Outpatient Medications:     dicyclomine (BENTYL) 10 MG capsule, Take 1 capsule by mouth 4 (Four) Times a Day., Disp: 120 capsule, Rfl: 1    EPINEPHrine (EPIPEN) 0.3 MG/0.3ML solution auto-injector injection, INJECT AS NEEDED FOR SYSTEMIC ALLERGIC REACTION, Disp: , Rfl:     famotidine (Pepcid) 20 MG tablet, Take 1 tablet by mouth 2 (Two) Times a Day for 180 days., Disp: 180 tablet, Rfl: 1    FIBER SELECT GUMMIES PO, Take  by mouth., Disp: , Rfl:     levothyroxine (SYNTHROID, LEVOTHROID) 100 MCG tablet, Take 1 tablet by mouth Daily., Disp: 90 tablet, Rfl: 3    multivitamin (MULTI-VITAMIN DAILY PO), 1 tablet Daily., Disp: , Rfl:     polyethylene glycol (MIRALAX) 17 g packet, Take 17 g by mouth Daily., Disp: 1 each, Rfl: 3    predniSONE (DELTASONE) 10 MG tablet, Take 2 tablets by mouth Daily for 7 days, THEN 1 tablet Daily for 7 days., Disp: 21 tablet, Rfl: 0    rizatriptan (MAXALT) 10 MG tablet, Take 1 tablet by mouth 1 (One) Time As Needed for Migraine., Disp: 27 tablet, Rfl: 3    budesonide (Pulmicort) 0.5 MG/2ML nebulizer solution, Dose: 2 mg BID PO Mix with sucralose (Splenda; 10 1-gram packets per 1 mg of budesonide, creating a volume of approximately 8 mL) or another carrier vehicle that is not liquid, Disp: 100 mL, Rfl: 0    Objective      Blood pressure 104/78, pulse 94, temperature 97 °F (36.1 °C), resp. rate 16, height 175.3 cm (69\"), weight 72.1 kg (159 lb), SpO2 96%.    BMI is within " normal parameters. No other follow-up for BMI required.       Physical Exam     General Appearance:    Alert, cooperative, no distress, appears stated age   Head:    Normocephalic, without obvious abnormality, atraumatic   Eyes:    PERRL, conjunctiva/corneas clear, EOM's intact   Ears:    Normal TM's and external ear canals, both ears   Nose:   Nares normal, septum midline, mucosa normal, no drainage   or sinus tenderness   Throat:   Lips, mucosa, and tongue normal; teeth and gums normal   Neck:   Supple, symmetrical, trachea midline, no adenopathy;        thyroid:  No enlargement/tenderness/nodules; no carotid    bruit or JVD   Back:     Symmetric, no curvature, ROM normal, no CVA tenderness   Lungs:     Clear to auscultation bilaterally, respirations unlabored   Chest wall:    No tenderness or deformity   Heart:    Regular rate and rhythm, S1 and S2 normal, no murmur,        rub or gallop   Abdomen:     Soft, non-tender, bowel sounds active all four quadrants,     no masses, no organomegaly   Extremities:   Extremities normal, atraumatic, no cyanosis or edema   Pulses:   2+ and symmetric all extremities   Skin:   Skin color, texture, turgor normal, no rashes or lesions   Lymph nodes:   Cervical, supraclavicular, and axillary nodes normal   Neurologic:   CNII-XII intact. Normal strength, sensation and reflexes       throughout      Results for orders placed or performed in visit on 05/17/24   Comprehensive Metabolic Panel    Collection Time: 06/12/24  8:03 AM    Specimen: Blood   Result Value Ref Range    Glucose 94 70 - 99 mg/dL    BUN 9 6 - 24 mg/dL    Creatinine 0.86 0.57 - 1.00 mg/dL    EGFR Result 78 >59 mL/min/1.73    BUN/Creatinine Ratio 10 9 - 23    Sodium 140 134 - 144 mmol/L    Potassium 4.3 3.5 - 5.2 mmol/L    Chloride 101 96 - 106 mmol/L    Total CO2 25 20 - 29 mmol/L    Calcium 10.2 8.7 - 10.2 mg/dL    Total Protein 6.8 6.0 - 8.5 g/dL    Albumin 4.8 3.8 - 4.9 g/dL    Globulin 2.0 1.5 - 4.5 g/dL    A/G  Ratio 2.4     Total Bilirubin 0.5 0.0 - 1.2 mg/dL    Alkaline Phosphatase 76 44 - 121 IU/L    AST (SGOT) 15 0 - 40 IU/L    ALT (SGPT) 15 0 - 32 IU/L   TSH    Collection Time: 06/12/24  8:03 AM    Specimen: Blood   Result Value Ref Range    TSH 0.444 (L) 0.450 - 4.500 uIU/mL   T4, Free    Collection Time: 06/12/24  8:03 AM    Specimen: Blood   Result Value Ref Range    Free T4 1.43 0.82 - 1.77 ng/dL   Vitamin B12    Collection Time: 06/12/24  8:03 AM    Specimen: Blood   Result Value Ref Range    Vitamin B-12 747 232 - 1,245 pg/mL   Lipid Panel    Collection Time: 06/12/24  8:03 AM    Specimen: Blood   Result Value Ref Range    Total Cholesterol 245 (H) 100 - 199 mg/dL    Triglycerides 97 0 - 149 mg/dL    HDL Cholesterol 81 >39 mg/dL    VLDL Cholesterol Varun 17 5 - 40 mg/dL    LDL Chol Calc (NIH) 147 (H) 0 - 99 mg/dL   Vitamin D,25-Hydroxy    Collection Time: 06/12/24  8:03 AM    Specimen: Blood   Result Value Ref Range    25 Hydroxy, Vitamin D 56.3 30.0 - 100.0 ng/mL   Hemoglobin A1c    Collection Time: 06/12/24  8:03 AM    Specimen: Blood   Result Value Ref Range    Hemoglobin A1C 5.5 4.8 - 5.6 %   Vitamin B6    Collection Time: 06/12/24  8:03 AM    Specimen: Blood   Result Value Ref Range    Vitamin B6 32.3 3.4 - 65.2 ug/L   CBC & Differential    Collection Time: 06/12/24  8:03 AM    Specimen: Blood   Result Value Ref Range    WBC 4.1 3.4 - 10.8 x10E3/uL    RBC 5.10 3.77 - 5.28 x10E6/uL    Hemoglobin 14.5 11.1 - 15.9 g/dL    Hematocrit 45.2 34.0 - 46.6 %    MCV 89 79 - 97 fL    MCH 28.4 26.6 - 33.0 pg    MCHC 32.1 31.5 - 35.7 g/dL    RDW 12.8 11.7 - 15.4 %    Platelets 244 150 - 450 x10E3/uL    Neutrophil Rel % 60 Not Estab. %    Lymphocyte Rel % 31 Not Estab. %    Monocyte Rel % 6 Not Estab. %    Eosinophil Rel % 1 Not Estab. %    Basophil Rel % 2 Not Estab. %    Neutrophils Absolute 2.5 1.4 - 7.0 x10E3/uL    Lymphocytes Absolute 1.3 0.7 - 3.1 x10E3/uL    Monocytes Absolute 0.3 0.1 - 0.9 x10E3/uL    Eosinophils  Absolute 0.0 0.0 - 0.4 x10E3/uL    Basophils Absolute 0.1 0.0 - 0.2 x10E3/uL    Immature Granulocyte Rel % 0 Not Estab. %    Immature Grans Absolute 0.0 0.0 - 0.1 x10E3/uL         Assessment & Plan   Lung nodual stable.  Likely benign.     Will move apt for 12 2 has apt for June next year.             Diagnoses and all orders for this visit:    1. Acquired hypothyroidism  -     levothyroxine (SYNTHROID, LEVOTHROID) 100 MCG tablet; Take 1 tablet by mouth Daily.  Dispense: 90 tablet; Refill: 3    2. Other migraine without status migrainosus, not intractable  -     rizatriptan (MAXALT) 10 MG tablet; Take 1 tablet by mouth 1 (One) Time As Needed for Migraine.  Dispense: 27 tablet; Refill: 3      No follow-ups on file.          There are no Patient Instructions on file for this visit.     Diogo Monge MD    Assessment & Plan       Answers submitted by the patient for this visit:  Other (Submitted on 10/19/2024)  Please describe your symptoms.: f/u chest ct  Have you had these symptoms before?: No  How long have you been having these symptoms?: 1-4 days  Primary Reason for Visit (Submitted on 10/19/2024)  What is the primary reason for your visit?: Problem Not Listed

## 2024-10-24 RX ORDER — LEVOTHYROXINE SODIUM 100 UG/1
100 TABLET ORAL DAILY
Qty: 90 TABLET | Refills: 3 | Status: SHIPPED | OUTPATIENT
Start: 2024-10-24

## 2024-10-24 RX ORDER — RIZATRIPTAN BENZOATE 10 MG/1
10 TABLET ORAL ONCE AS NEEDED
Qty: 27 TABLET | Refills: 3 | Status: SHIPPED | OUTPATIENT
Start: 2024-10-24

## 2024-11-18 ENCOUNTER — PATIENT MESSAGE (OUTPATIENT)
Dept: INTERNAL MEDICINE | Facility: CLINIC | Age: 59
End: 2024-11-18
Payer: COMMERCIAL

## 2024-11-18 RX ORDER — EPINEPHRINE 0.3 MG/.3ML
0.3 INJECTION SUBCUTANEOUS ONCE
Qty: 1 EACH | Refills: 0 | Status: SHIPPED | OUTPATIENT
Start: 2024-11-18 | End: 2024-11-18

## (undated) DEVICE — HYBRID CO2 TUBING/CAP SET FOR OLYMPUS® SCOPES & CO2 SOURCE: Brand: ERBE

## (undated) DEVICE — VLV SXN AIR/H2O ORCAPOD3 1P/U STRL

## (undated) DEVICE — Device

## (undated) DEVICE — ENDOSCOPY PORT CONNECTOR FOR OLYMPUS® SCOPES: Brand: ERBE

## (undated) DEVICE — LUBE JELLY PK/2.75GM STRL BX/144

## (undated) DEVICE — FRCP BX RADJAW4 NDL 2.8 240 STD OG